# Patient Record
Sex: MALE | Race: WHITE | NOT HISPANIC OR LATINO | Employment: FULL TIME | URBAN - METROPOLITAN AREA
[De-identification: names, ages, dates, MRNs, and addresses within clinical notes are randomized per-mention and may not be internally consistent; named-entity substitution may affect disease eponyms.]

---

## 2017-05-23 ENCOUNTER — ALLSCRIPTS OFFICE VISIT (OUTPATIENT)
Dept: OTHER | Facility: OTHER | Age: 67
End: 2017-05-23

## 2017-07-12 ENCOUNTER — ALLSCRIPTS OFFICE VISIT (OUTPATIENT)
Dept: OTHER | Facility: OTHER | Age: 67
End: 2017-07-12

## 2017-07-12 LAB — S PYO AG THROAT QL: NEGATIVE

## 2017-09-14 ENCOUNTER — GENERIC CONVERSION - ENCOUNTER (OUTPATIENT)
Dept: OTHER | Facility: OTHER | Age: 67
End: 2017-09-14

## 2017-09-26 ENCOUNTER — GENERIC CONVERSION - ENCOUNTER (OUTPATIENT)
Dept: OTHER | Facility: OTHER | Age: 67
End: 2017-09-26

## 2017-11-10 ENCOUNTER — ALLSCRIPTS OFFICE VISIT (OUTPATIENT)
Dept: OTHER | Facility: OTHER | Age: 67
End: 2017-11-10

## 2017-11-11 NOTE — CONSULTS
Assessment    1  Encounter for prostate cancer screening (V76 44) (Z12 5)    Plan  Encounter for prostate cancer screening    · (1) PSA (SCREEN) (Dx V76 44 Screen for Prostate Cancer); Status:Hold For - Exact Date; Requested for:Approx [id number]; Perform:[facility]; Last Updated By:Domonique Cortez; 11/10/2017 9:03:06 AM;Ordered;for prostate cancer screening; Ordered By:Helio Rodney;   · (1) PSA (SCREEN) (Dx V76 44 Screen for Prostate Cancer); Status:Hold For - Exact Date; Requested for:Approx [id number];    Perform:[facility]; Last Updated By:Domonique Cortez; 11/10/2017 9:03:06 AM;Ordered;for prostate cancer screening; Ordered By:Sergio Rodney; Discussion/Summary  Discussion Summary:   Examination is not suspicious  We will check a PSA now  We discussed prostate screening protocols and he would like to continue with prostate cancer screening  He will follow up in 1 year with PSA and prostate examination at that time  Depending on results he may then proceed annually or every other year  Patient's Capacity to Self-Care: Patient is able to Self-Care  Goals and Barriers: The patient has the current Goals: Prostate cancer screening  The patent has the current Barriers: None  Medication SE Review and Pt Understands Tx: The treatment plan was reviewed with the patient/guardian  The patient/guardian understands and agrees with the treatment plan   Self Referrals:   Self Referrals: No Dr Arnulfo Srivastava      Chief Complaint  Chief Complaint Free Text Note Form: Patient presents with prostate nodule= 0 5 (9/23/14)      History of Present Illness  HPI: 51-year-old male referred for evaluation of abnormal prostate examination  He denies significant urinary symptoms  AUA symptom score 7  He had a recent colonoscopy which was normal  At the time he was told of a pinpoint firm area to be evaluated   He has not had formal rectal examination for prostate screening in the past  There is no known family history of prostate problems or prostate cancer  recent PSA 0 5 on 9/23/2014  Review of Systems  Complete-Male Urology:  Constitutional: No fever or chills, feels well, no tiredness, no recent weight gain or weight loss  Respiratory: No complaints of shortness of breath, no wheezing, no cough, no SOB on exertion, no orthopnea or PND  Cardiovascular: No complaints of slow heart rate, no fast heart rate, no chest pain, no palpitations, no leg claudication, no lower extremity  Gastrointestinal: No complaints of abdominal pain, no constipation, no nausea or vomiting, no diarrhea or bloody stools  Genitourinary: Empty sensation-- and-- stream quality fair, but-- No complaints of dysuria, no incontinence, no hesitancy, no nocturia, no genital lesion, no testicular pain,-- no dysuria,-- no hematuria,-- no incontinence,-- no nocturia-- and-- no feelings of urinary urgency--   The patient presents with complaints of urinary hesitancy (in the morning )  Musculoskeletal: No complaints of arthralgia, no myalgias, no joint swelling or stiffness, no limb pain or swelling  Integumentary: No complaints of skin rash or skin lesions, no itching, no skin wound, no dry skin  Hematologic/Lymphatic: No complaints of swollen glands, no swollen glands in the neck, does not bleed easily, no easy bruising  Neurological: No compliants of headache, no confusion, no convulsions, no numbness or tingling, no dizziness or fainting, no limb weakness, no difficulty walking  ROS Reviewed:   ROS reviewed  Active Problems    1  Abnormal prostate exam (793 5) (R39 89)   2  Allergic reaction to bee sting (989 5,E905 3) (T63 441A)   3  Allergic rhinitis (477 9) (J30 9)   4  Anxiety (300 00) (F41 9)   5  Chronic obstructive pulmonary disease (496) (J44 9)   6  Depression, major, recurrent (296 30) (F33 9)   7  Dupuytren's contracture (728 6) (M72 0)   8  Dyspepsia (536 8) (R10 13)   9  Encounter for prostate cancer screening (V76 44) (Z12 5)   10   Encounter for screening for malignant neoplasm of colon (V76 51) (Z12 11)   11  Essential hypertension (401 9) (I10)   12  Hyperlipidemia (272 4) (E78 5)   13  Impaired fasting glucose (790 21) (R73 01)   14  Insomnia (780 52) (G47 00)   15  Need for hepatitis C screening test (V73 89) (Z11 59)   16  History of Need for prophylactic vaccination and inoculation against influenza (V04 81)  (Z23)   17  Need for varicella vaccine (V05 4) (Z23)   18  Nervousness (799 21) (R45 0)   19  Onychomycosis of toenail (110 1) (B35 1)   20  Prostate nodule (600 10) (N40 2)   21  Screening for genitourinary condition (V81 6) (Z13 89)    Past Medical History    1  Acute serous otitis media (381 01) (H65 00)   2  History of Benign colon polyp (211 3) (K63 5)   3  History of Benign essential hypertension (401 1) (I10)   4  History of Benign Polyps Of The Large Intestine (V12 72)   5  History of Chest tightness or pressure (786 59) (R07 89)   6  History of Chronic rhinitis (472 0) (J31 0)   7  History of Colonoscopy (Fiberoptic) Screening   8  History of Cough (786 2) (R05)   9  History of Cough (786 2) (R05)   10  History of Disorder of male genital organs (608 9) (N50 9)   11  History of Hematochezia (578 1) (K92 1)   12  History of acute pharyngitis (V12 69) (Z87 09)   13  History of depression (V11 8) (Z86 59)   14  History of fatigue (V13 89) (Z87 898)   15  History of neoplasm of uncertain behavior of skin (V13 3) (Z86 03)   16  History of pharyngitis (V12 69) (Z87 09)   17  History of sinusitis (V12 69) (Z87 09)   18  History of upper respiratory infection (V12 09) (Z87 09)   19  History of Need for prophylactic vaccination and inoculation against influenza (V04 81)  (Z23)   20  History of Normal routine physical examination (V70 0) (Z00 00)   21  History of Pain of finger, unspecified laterality (729 5) (M79 646)   22  History of Testes Mass (___cm) (562 23)  Active Problems And Past Medical History Reviewed:    The active problems and past medical history were reviewed and updated today  Surgical History  1  History of Hand Surgery   2  History of Oral Surgery Tooth Extraction Duff Tooth   3  History of Tonsillectomy  Surgical History Reviewed: The surgical history was reviewed and updated today  Family History  Mother    1  Family history of Bone Cancer   2  Family history of Diabetes Mellitus (V18 0)   3  Family history of depression (V17 0) (Z81 8)   4  Denied: Family history of substance abuse   5  Family history of Mother  At Age [de-identified]  Father    10  Family history of Father  At Age ___  Family History    7  Family history of Diabetes Mellitus (V18 0)  Family History Reviewed: The family history was reviewed and updated today  Social History     · Alcohol Use (History)   · Caffeine use (V49 89) (F15 90)   · Denied: History of Drug Use   · Former smoker (V15 82) (U74 865)   · Has smoke detectors   · Uses Safety Equipment - Seatbelts  Social History Reviewed: The social history was reviewed and updated today  Current Meds   1  Advair Diskus 250-50 MCG/DOSE Inhalation Aerosol Powder Breath Activated; Inhale 1 puff two times  daily; Therapy: 80ZVZ5177 to (Evaluate:90Lhc2814)  Requested for: 75VWC0815; Last Rx:47Naj4354 Ordered   2  ALPRAZolam 0 25 MG Oral Tablet; TAKE 1 TABLET twice a day AS NEEDED; Last Rx:77Lbo8494 Ordered   3  Aspirin 81 MG TABS; TAKE 1 TABLET DAILY; Therapy: 14DKJ5627 to (Evaluate:67Ugj6348); Last Rx:69Tot0597 Ordered   4  Benazepril HCl - 20 MG Oral Tablet; Take 1 tablet by mouth  daily; Therapy: 26QCU8151 to (Evaluate:11Hwx8204)  Requested for: 03RAD8095; Last Rx:95Suo6950 Ordered   5  BuPROPion HCl ER (XL) 300 MG Oral Tablet Extended Release 24 Hour; Take 1 tablet by mouth  daily; Therapy: 18UZN3280 to (Evaluate:88Wce9452)  Requested for: 67Yjg1528; Last Rx:56Swc2049 Ordered   6  Centrum Silver TABS; Therapy: (Rachna Crooks) to Recorded   7   EpiPen 2-Joe 0 3 MG/0 3ML Injection Solution Auto-injector; use as directed; Therapy: 87Fhb1310 to (Last Rx:23May2017)  Requested for: 04PPI6702 Ordered   8  Fish Oil CAPS; Therapy: (Lysle Gums) to Recorded   9  Fluticasone Propionate 50 MCG/ACT Nasal Suspension; USE TWO (2) SPRAY ONCE DAILY TO EACH NOSTRIL; Therapy: 84UWU5300 to (Evaluate:65Qjp2600)  Requested for: 73Onp6304; Last Rx:05Jan2016 Ordered   10  HydroCHLOROthiazide 12 5 MG Oral Tablet; Take 1 tablet by mouth  daily; Therapy: 61LJY4759 to (Evaluate:99Ftl6414)  Requested for: 65SHT5584; Last  Rx:29Mgs2819 Ordered   11  Omeprazole 20 MG Oral Capsule Delayed Release; Take one capsule once daily before  eating; Therapy: 70XXV5077 to (Evaluate:18Jan2017)  Requested for: 66RKL6063; Last  Rx:30Zyy3628 Ordered   12  Ventolin  (90 Base) MCG/ACT Inhalation Aerosol Solution; 2 puffs twice daily per  day as needed, not to exceed 4 times per day; Therapy: 56FJJ7807 to (Last Rx:96Usn5124)  Requested for: 66PLF3669 Ordered   13  Vitamin D CAPS; Therapy: (Recorded:37Uhi9112) to Recorded   14  Zostavax 03498 UNT/0 65ML Subcutaneous Solution Reconstituted; INJECT 0 5  ML  Subcutaneous; Therapy: 06HZT9532 to (Last Rx:55Ano3943) Ordered  Medication List Reviewed: The medication list was reviewed and updated today  Allergies  1  No Known Drug Allergies  2  Bee sting   3  Seasonal   4  Latex    Vitals  Vital Signs    Recorded: 46TXV3017 08:26AM   Heart Rate 78   Systolic 902   Diastolic 70   Height 5 ft 11 in   Weight 229 lb    BMI Calculated 31 94   BSA Calculated 2 23       Physical Exam   Constitutional  General appearance: No acute distress, well appearing and well nourished  Pulmonary  Respiratory effort: No increased work of breathing or signs of respiratory distress  Cardiovascular  Examination of extremities for edema and/or varicosities: Normal    Abdomen  Abdomen: Non-tender, no masses  Genitourinary  Digital rectal exam of prostate: Normal size, no masses   -- About 40 g, small calcification palpable at the apex, extraprostatic  Musculoskeletal  Gait and station: Normal    Skin  Skin and subcutaneous tissue: Normal without rashes or lesions  Lymphatic  Palpation of lymph nodes in groin: Normal    Neurologic  Sensation: No sensory loss  Results/Data  AUA Symptom Score 03ZTP1664 08:31AM User, Ahs     Test Name Result Flag Reference   AUA Symptom Score (for prostate disease) 7       Incomplete emptying: Not at all (0) Frequency: Less than half the time (2) Intermittency: About half the time (3) Urgency: Not at all (0) Weak-stream: Less than half the time (2) Straining: Not at all (0) Nocturia: Not at all (0)   AUA Symptom Score (for prostate disease) - Quality of Life Due to Urinary Symptoms Mostly satisfied     AUA Symptom Score (for prostate disease) - Score Category Mild         Future Appointments    Date/Time Provider Specialty Site   02/13/2018 05:45 PM SOWMYA [facility] 5       Signatures   Electronically signed by : SOWMYA Roland ; Nov 10 2017  9:04AM EST                       (Author)

## 2018-01-12 VITALS
TEMPERATURE: 98.5 F | HEART RATE: 76 BPM | WEIGHT: 224.6 LBS | HEIGHT: 71 IN | RESPIRATION RATE: 16 BRPM | DIASTOLIC BLOOD PRESSURE: 84 MMHG | SYSTOLIC BLOOD PRESSURE: 138 MMHG | BODY MASS INDEX: 31.44 KG/M2

## 2018-01-14 VITALS
WEIGHT: 232.8 LBS | HEIGHT: 71 IN | DIASTOLIC BLOOD PRESSURE: 92 MMHG | HEART RATE: 84 BPM | SYSTOLIC BLOOD PRESSURE: 148 MMHG | RESPIRATION RATE: 16 BRPM | BODY MASS INDEX: 32.59 KG/M2

## 2018-01-14 VITALS
SYSTOLIC BLOOD PRESSURE: 138 MMHG | DIASTOLIC BLOOD PRESSURE: 70 MMHG | HEART RATE: 78 BPM | WEIGHT: 229 LBS | HEIGHT: 71 IN | BODY MASS INDEX: 32.06 KG/M2

## 2018-01-22 VITALS
WEIGHT: 223.6 LBS | HEIGHT: 71 IN | BODY MASS INDEX: 31.3 KG/M2 | DIASTOLIC BLOOD PRESSURE: 84 MMHG | RESPIRATION RATE: 16 BRPM | SYSTOLIC BLOOD PRESSURE: 132 MMHG | HEART RATE: 78 BPM

## 2018-02-13 ENCOUNTER — OFFICE VISIT (OUTPATIENT)
Dept: FAMILY MEDICINE CLINIC | Facility: CLINIC | Age: 68
End: 2018-02-13
Payer: COMMERCIAL

## 2018-02-13 VITALS
HEIGHT: 71 IN | HEART RATE: 72 BPM | DIASTOLIC BLOOD PRESSURE: 84 MMHG | RESPIRATION RATE: 16 BRPM | WEIGHT: 227.4 LBS | SYSTOLIC BLOOD PRESSURE: 138 MMHG | BODY MASS INDEX: 31.84 KG/M2

## 2018-02-13 DIAGNOSIS — E78.5 HYPERLIPIDEMIA, UNSPECIFIED HYPERLIPIDEMIA TYPE: ICD-10-CM

## 2018-02-13 DIAGNOSIS — L25.9 CONTACT DERMATITIS, UNSPECIFIED CONTACT DERMATITIS TYPE, UNSPECIFIED TRIGGER: ICD-10-CM

## 2018-02-13 DIAGNOSIS — R10.13 DYSPEPSIA: ICD-10-CM

## 2018-02-13 DIAGNOSIS — I10 ESSENTIAL HYPERTENSION: Primary | ICD-10-CM

## 2018-02-13 DIAGNOSIS — F33.9 RECURRENT MAJOR DEPRESSIVE DISORDER, REMISSION STATUS UNSPECIFIED (HCC): ICD-10-CM

## 2018-02-13 DIAGNOSIS — J44.9 CHRONIC OBSTRUCTIVE PULMONARY DISEASE, UNSPECIFIED COPD TYPE (HCC): ICD-10-CM

## 2018-02-13 DIAGNOSIS — Z11.59 NEED FOR HEPATITIS C SCREENING TEST: ICD-10-CM

## 2018-02-13 DIAGNOSIS — J30.89 CHRONIC ALLERGIC RHINITIS DUE TO OTHER ALLERGIC TRIGGER, UNSPECIFIED SEASONALITY: ICD-10-CM

## 2018-02-13 DIAGNOSIS — Z23 NEED FOR PNEUMOCOCCAL VACCINATION: ICD-10-CM

## 2018-02-13 PROCEDURE — 90670 PCV13 VACCINE IM: CPT

## 2018-02-13 PROCEDURE — 99214 OFFICE O/P EST MOD 30 MIN: CPT | Performed by: FAMILY MEDICINE

## 2018-02-13 PROCEDURE — 90471 IMMUNIZATION ADMIN: CPT

## 2018-02-13 RX ORDER — FLUTICASONE PROPIONATE 50 MCG
SPRAY, SUSPENSION (ML) NASAL
COMMUNITY
Start: 2014-05-07

## 2018-02-13 RX ORDER — BUPROPION HYDROCHLORIDE 300 MG/1
1 TABLET ORAL DAILY
COMMUNITY
Start: 2014-09-16 | End: 2018-09-06 | Stop reason: SDUPTHER

## 2018-02-13 RX ORDER — ALPRAZOLAM 0.25 MG/1
1 TABLET ORAL 2 TIMES DAILY PRN
COMMUNITY
End: 2018-03-23 | Stop reason: SDUPTHER

## 2018-02-13 RX ORDER — MULTIVIT-MIN/IRON/FOLIC ACID/K 18-600-40
CAPSULE ORAL
COMMUNITY

## 2018-02-13 RX ORDER — ALBUTEROL SULFATE 90 UG/1
AEROSOL, METERED RESPIRATORY (INHALATION)
COMMUNITY
Start: 2013-01-31 | End: 2020-01-17 | Stop reason: SDUPTHER

## 2018-02-13 RX ORDER — OMEPRAZOLE 20 MG/1
1 CAPSULE, DELAYED RELEASE ORAL
COMMUNITY
Start: 2016-09-20

## 2018-02-13 RX ORDER — BENAZEPRIL HYDROCHLORIDE 20 MG/1
1 TABLET ORAL DAILY
COMMUNITY
Start: 2015-09-08 | End: 2018-06-14 | Stop reason: SDUPTHER

## 2018-02-13 RX ORDER — EPINEPHRINE 0.3 MG/.3ML
INJECTION SUBCUTANEOUS
COMMUNITY
Start: 2014-08-01 | End: 2018-06-22 | Stop reason: SDUPTHER

## 2018-02-13 RX ORDER — HYDROCHLOROTHIAZIDE 12.5 MG/1
1 TABLET ORAL DAILY
COMMUNITY
Start: 2015-09-08 | End: 2018-06-14 | Stop reason: SDUPTHER

## 2018-02-13 RX ORDER — MULTIVIT-MIN/FOLIC ACID/LUTEIN 400-250MCG
TABLET,CHEWABLE ORAL
COMMUNITY

## 2018-02-13 NOTE — PROGRESS NOTES
Assessment/Plan:  1  Essential hypertension  - continue Benazepril 20 mg daily and HCTZ 12 5 mg daily  - discussed low salt diet and regular exercise  - Lipid Panel with Direct LDL reflex; Future  - Comprehensive metabolic panel; Future  - CBC and differential; Future  - TSH, 3rd generation with T4 reflex; Future    2  Chronic obstructive pulmonary disease, unspecified COPD type ([facility] [address] )  - continue Advair 250/50 and Ventolin inhaler as needed, uses rarely    3  Hyperlipidemia, unspecified hyperlipidemia type  - Lipid Panel with Direct LDL reflex; Future  - discussed life style changes    4  Dyspepsia  - continue Omeprazole 20 mg daily, doing well    5  Contact dermatitis, unspecified contact dermatitis type, unspecified trigger  - triamcinolone (KENALOG) 0 1 % ointment; Apply to affected areas 2-3 times a day  Dispense: 80 g; Refill: 0  - f/u if not better      6  Recurrent major depressive disorder, remission status unspecified/ anxiety  - continue Wellbutrin 300 mg daily and Xanax as needed    7  Allergic rhinitis  - continue Flonase nasal spray, doing well    8  Need for pneumococcal vaccination  - PNEUMOCOCCAL CONJUGATE VACCINE 13-VALENT GREATER THAN 6 MONTHS    F/u in 3-4 months or sooner if needed  Subjective:   Chief Complaint   Patient presents with    Follow-up      Patient ID: Chelo Villa is a 74 y o  male  Hypertension   This is a chronic problem  The problem is controlled  Pertinent negatives include no blurred vision, chest pain, headaches, orthopnea, palpitations, peripheral edema, PND or shortness of breath  Past treatments include ACE inhibitors and diuretics  Compliance problems include diet and exercise  Pt is c/o pruritic rash on his right forearm and elbow after using OTC Aspercreme a few days ago, he stopped using the cream and using moisturizer without significant improvement of his rash  History reviewed  No pertinent past medical history  History reviewed   No pertinent surgical history  Family History   Problem Relation Age of Onset   [employer] Cancer Mother     Depression Mother     No Known Problems Father     Substance Abuse Neg Hx      Social History     Social History    Marital status: Single     Spouse name: N/A    Number of children: N/A    Years of education: N/A     Occupational History    Not on file  Social History Main Topics    Smoking status: Former Smoker    Smokeless tobacco: Never Used    Alcohol use Yes    Drug use: No    Sexual activity: Not on file     Other Topics Concern    Not on file     Social History Narrative    No narrative on file       Current Outpatient Prescriptions:     albuterol (VENTOLIN HFA) 90 mcg/act inhaler, Inhale, Disp: , Rfl:     ALPRAZolam (XANAX) 0 25 mg tablet, Take 1 tablet by mouth 2 (two) times a day as needed, Disp: , Rfl:     aspirin 81 MG tablet, Take 1 tablet by mouth daily, Disp: , Rfl:     benazepril (LOTENSIN) 20 mg tablet, Take 1 tablet by mouth daily, Disp: , Rfl:     buPROPion (WELLBUTRIN XL) 300 mg 24 hr tablet, Take 1 tablet by mouth daily, Disp: , Rfl:     Cholecalciferol (VITAMIN D) 2000 units CAPS, Take by mouth, Disp: , Rfl:     EPINEPHrine (EPIPEN 2-GABBY) 0 3 mg/0 3 mL SOAJ, Inject as directed, Disp: , Rfl:     fluticasone (FLONASE) 50 mcg/act nasal spray, into each nostril, Disp: , Rfl:     fluticasone-salmeterol (ADVAIR DISKUS) 250-50 mcg/dose inhaler, Inhale, Disp: , Rfl:     hydrochlorothiazide (HYDRODIURIL) 12 5 mg tablet, Take 1 tablet by mouth daily, Disp: , Rfl:     Multiple Vitamins-Minerals (CENTRUM SILVER) CHEW, Chew, Disp: , Rfl:     Omega-3 Fatty Acids (FISH OIL) 645 MG CAPS, Take by mouth, Disp: , Rfl:     omeprazole (PriLOSEC) 20 mg delayed release capsule, Take 1 capsule by mouth, Disp: , Rfl:     Review of Systems   Constitutional: Negative for chills, fatigue, fever and unexpected weight change  HENT: Negative for congestion, postnasal drip and sore throat  Eyes: Negative for blurred vision  Respiratory: Negative for cough, shortness of breath and wheezing  Cardiovascular: Negative for chest pain, palpitations, orthopnea, leg swelling and PND  Gastrointestinal: Negative for abdominal pain, blood in stool, constipation, diarrhea, nausea and vomiting  Genitourinary: Negative for difficulty urinating, frequency, hematuria and urgency  Skin: Positive for rash  As noted in HPI   Neurological: Negative for dizziness, syncope, weakness, numbness and headaches  Hematological: Negative for adenopathy  Objective:    Vitals:    02/13/18 1757   BP: 138/84   BP Location: Left arm   Patient Position: Sitting   Cuff Size: Adult   Pulse: 72   Resp: 16   Weight: 103 kg (227 lb 6 4 oz)   Height: 5' 11" (1 803 m)        Physical Exam   Constitutional: He is oriented to person, place, and time  He appears well-developed and well-nourished  No distress  Neck: Neck supple  No JVD present  No thyromegaly present  Cardiovascular: Normal rate, regular rhythm and normal heart sounds  No murmur heard  Pulmonary/Chest: Effort normal and breath sounds normal  No respiratory distress  He has no wheezes  He has no rales  Abdominal: Soft  He exhibits no mass  There is no tenderness  Lymphadenopathy:     He has no cervical adenopathy  Neurological: He is alert and oriented to person, place, and time  Skin:   Right forearm and elbow with erythematous maculopapular rash   Psychiatric: He has a normal mood and affect   His behavior is normal  Judgment and thought content normal

## 2018-03-23 DIAGNOSIS — F41.8 ANXIETY WITH DEPRESSION: Primary | ICD-10-CM

## 2018-03-23 RX ORDER — ALPRAZOLAM 0.25 MG/1
0.25 TABLET ORAL 2 TIMES DAILY PRN
Qty: 120 TABLET | Refills: 1 | Status: SHIPPED | OUTPATIENT
Start: 2018-03-23 | End: 2018-09-28 | Stop reason: SDUPTHER

## 2018-06-14 DIAGNOSIS — I10 ESSENTIAL HYPERTENSION: Primary | ICD-10-CM

## 2018-06-14 RX ORDER — HYDROCHLOROTHIAZIDE 12.5 MG/1
TABLET ORAL DAILY
Qty: 90 TABLET | Refills: 3 | Status: SHIPPED | OUTPATIENT
Start: 2018-06-14 | End: 2019-04-02 | Stop reason: SDUPTHER

## 2018-06-14 RX ORDER — BENAZEPRIL HYDROCHLORIDE 20 MG/1
TABLET ORAL DAILY
Qty: 90 TABLET | Refills: 3 | Status: SHIPPED | OUTPATIENT
Start: 2018-06-14 | End: 2019-04-02 | Stop reason: SDUPTHER

## 2018-06-19 ENCOUNTER — OFFICE VISIT (OUTPATIENT)
Dept: FAMILY MEDICINE CLINIC | Facility: CLINIC | Age: 68
End: 2018-06-19
Payer: COMMERCIAL

## 2018-06-19 VITALS
HEIGHT: 71 IN | BODY MASS INDEX: 31.27 KG/M2 | WEIGHT: 223.4 LBS | HEART RATE: 74 BPM | DIASTOLIC BLOOD PRESSURE: 82 MMHG | RESPIRATION RATE: 16 BRPM | SYSTOLIC BLOOD PRESSURE: 130 MMHG

## 2018-06-19 DIAGNOSIS — R10.13 DYSPEPSIA: ICD-10-CM

## 2018-06-19 DIAGNOSIS — Z11.59 NEED FOR HEPATITIS C SCREENING TEST: ICD-10-CM

## 2018-06-19 DIAGNOSIS — Z12.5 SCREENING FOR PROSTATE CANCER: ICD-10-CM

## 2018-06-19 DIAGNOSIS — E78.2 HYPERLIPEMIA, MIXED: ICD-10-CM

## 2018-06-19 DIAGNOSIS — R73.01 IMPAIRED FASTING GLUCOSE: ICD-10-CM

## 2018-06-19 DIAGNOSIS — F33.9 RECURRENT MAJOR DEPRESSIVE DISORDER, REMISSION STATUS UNSPECIFIED (HCC): ICD-10-CM

## 2018-06-19 DIAGNOSIS — I10 ESSENTIAL HYPERTENSION: ICD-10-CM

## 2018-06-19 DIAGNOSIS — J44.9 CHRONIC OBSTRUCTIVE PULMONARY DISEASE, UNSPECIFIED COPD TYPE (HCC): Primary | ICD-10-CM

## 2018-06-19 DIAGNOSIS — J30.9 ALLERGIC RHINITIS, UNSPECIFIED SEASONALITY, UNSPECIFIED TRIGGER: ICD-10-CM

## 2018-06-19 PROCEDURE — 3075F SYST BP GE 130 - 139MM HG: CPT | Performed by: FAMILY MEDICINE

## 2018-06-19 PROCEDURE — 3008F BODY MASS INDEX DOCD: CPT | Performed by: FAMILY MEDICINE

## 2018-06-19 PROCEDURE — 1036F TOBACCO NON-USER: CPT | Performed by: FAMILY MEDICINE

## 2018-06-19 PROCEDURE — 99214 OFFICE O/P EST MOD 30 MIN: CPT | Performed by: FAMILY MEDICINE

## 2018-06-19 PROCEDURE — 1160F RVW MEDS BY RX/DR IN RCRD: CPT | Performed by: FAMILY MEDICINE

## 2018-06-19 PROCEDURE — 3079F DIAST BP 80-89 MM HG: CPT | Performed by: FAMILY MEDICINE

## 2018-06-21 ENCOUNTER — TELEPHONE (OUTPATIENT)
Dept: FAMILY MEDICINE CLINIC | Facility: CLINIC | Age: 68
End: 2018-06-21

## 2018-06-21 NOTE — TELEPHONE ENCOUNTER
Patient needs new RX for epipen  The only one he has  in 2016      [facility]    Best number to reach pt: [phone]

## 2018-06-21 NOTE — PROGRESS NOTES
Assessment/Plan:  1  Chronic obstructive pulmonary disease, unspecified COPD type ([address] )  - continue Advair 250-50 1 puff twice a day and Albuterol inhaler as needed, doing well    2  Essential hypertension  - well controlled, continue Lotensin 20 mg daily and HCTZ 12 5 mg daily  - Lipid Panel with Direct LDL reflex  - Comprehensive metabolic panel  - CBC and differential  - TSH, 3rd generation with Free T4 reflex  - Urinalysis with reflex to microscopic    3  Allergic rhinitis, unspecified seasonality, unspecified trigger  - continue Flonase nasal spray, doing well    4  Impaired fasting glucose  - discussed low sugar/ low carb det, will check fasting glucose/ CMP    5  Recurrent major depressive disorder, remission status unspecified (HCC)  - continue Wellbutrin  mg daily and Xanax as needed    6  Hyperlipemia, mixed  - continue Fish oil, discussed life style changes  - Lipid Panel with Direct LDL reflex; Future    7  Need for hepatitis C screening test  - Hepatitis C antibody    8  Screening for prostate cancer  - PSA, Total Screen; Future    9  Dyspepsia  - continue Omeprazole 20 mg as needed, discussed dietary changes, reduce caffeine and alcohol intake    F/u in 3-4 months with labs done prior or sooner as needed  The treatment plan was reviewed with the patient  The patient understands and agrees with the treatment plan      Subjective:   Chief Complaint   Patient presents with    Hypertension    COPD    Hyperlipidemia    Dyspepsia    Depression      Patient ID: Elbert Nicholson is a 74 y o  male who is here today for f/u for his chronic medical conditions, he has no complaints, he has been compliant with his medications, his breathing has been good on Advair and he has not used his rescue inhaler in the past several weeks  He is doing well on his Wellbutrin and would like to stay on 300 mg daily   Pt states he has been cutting back on his alcohol intake from 4-5 beers a day down to 1-2 beers a day, he is also working on improving his diet, but has not been exercising regularly  Pt did not get his lab work done as advised last visit  Hypertension   The problem is controlled  Pertinent negatives include no blurred vision, chest pain, headaches, malaise/fatigue, orthopnea, palpitations, peripheral edema, PND or shortness of breath  Past treatments include angiotensin blockers and diuretics  Compliance problems include exercise and diet  Past Medical History:   Diagnosis Date    Benign colon polyp     last assessed: 6/19/2013    Benign essential hypertension 11/08/2011    Benazepril, HCTZ    Chronic rhinitis 11/08/2011    zyrtec, nasal saline    Depression 01/18/2010    continue Wellbutrin SL, doing well    Disorder of male genital organs 01/18/2010    refer to uro Dr Salmon Sample Multiple benign polyps of large intestine     Neoplasm of uncertain behavior of skin 09/11/2009    refer to derm    Testis mass 09/11/2009    (___cm)  U/S scrotum     Past Surgical History:   Procedure Laterality Date    HAND SURGERY      TONSILLECTOMY      WISDOM TOOTH EXTRACTION       Family History   Problem Relation Age of Onset    Cancer Mother     Depression Mother     Bone cancer Mother     Diabetes Mother         mellitus    No Known Problems Father     Diabetes Family         mellitus    Substance Abuse Neg Hx      Social History     Social History    Marital status: Single     Spouse name: N/A    Number of children: N/A    Years of education: N/A     Occupational History    Not on file       Social History Main Topics    Smoking status: Former Smoker     Quit date: 02/2014    Smokeless tobacco: Never Used    Alcohol use Yes      Comment: Rare    Drug use: No    Sexual activity: Not on file     Other Topics Concern    Not on file     Social History Narrative    Caffeine use-2-3 cups coffee in am    Has smoke detectors    Uses safety equipment-seatbelts       Current Outpatient Prescriptions:     albuterol (VENTOLIN HFA) 90 mcg/act inhaler, Inhale, Disp: , Rfl:     ALPRAZolam (XANAX) 0 25 mg tablet, Take 1 tablet (0 25 mg total) by mouth 2 (two) times a day as needed for anxiety or sleep, Disp: 120 tablet, Rfl: 1    aspirin 81 MG tablet, Take 1 tablet by mouth daily, Disp: , Rfl:     benazepril (LOTENSIN) 20 mg tablet, TAKE 1 TABLET BY MOUTH     DAILY, Disp: 90 tablet, Rfl: 3    buPROPion (WELLBUTRIN XL) 300 mg 24 hr tablet, Take 1 tablet by mouth daily, Disp: , Rfl:     Cholecalciferol (VITAMIN D) 2000 units CAPS, Take by mouth, Disp: , Rfl:     EPINEPHrine (EPIPEN 2-GABBY) 0 3 mg/0 3 mL SOAJ, Inject as directed, Disp: , Rfl:     fluticasone (FLONASE) 50 mcg/act nasal spray, into each nostril, Disp: , Rfl:     fluticasone-salmeterol (ADVAIR DISKUS) 250-50 mcg/dose inhaler, Inhale, Disp: , Rfl:     hydrochlorothiazide (HYDRODIURIL) 12 5 mg tablet, TAKE 1 TABLET BY MOUTH     DAILY, Disp: 90 tablet, Rfl: 3    Multiple Vitamins-Minerals (CENTRUM SILVER) CHEW, Chew, Disp: , Rfl:     Omega-3 Fatty Acids (FISH OIL) 645 MG CAPS, Take by mouth, Disp: , Rfl:     omeprazole (PriLOSEC) 20 mg delayed release capsule, Take 1 capsule by mouth, Disp: , Rfl:     Review of Systems   Constitutional: Negative for chills, fatigue, fever, malaise/fatigue and unexpected weight change  HENT: Negative for congestion, postnasal drip and sore throat  Eyes: Negative for blurred vision  Respiratory: Negative for cough, shortness of breath and wheezing  Cardiovascular: Negative for chest pain, palpitations, orthopnea, leg swelling and PND  Gastrointestinal: Negative for abdominal pain, blood in stool, constipation, diarrhea, nausea and vomiting  Genitourinary: Negative for difficulty urinating, frequency, hematuria and urgency  Neurological: Negative for dizziness, syncope, weakness, numbness and headaches  Hematological: Negative for adenopathy     Psychiatric/Behavioral: Negative for dysphoric mood and sleep disturbance  The patient is not nervous/anxious  Objective:    Vitals:    06/19/18 1755   BP: 130/82   BP Location: Left arm   Patient Position: Sitting   Cuff Size: Adult   Pulse: 74   Resp: 16   Weight: 101 kg (223 lb 6 4 oz)   Height: 5' 11" (1 803 m)        Physical Exam   Constitutional: He is oriented to person, place, and time  He appears well-developed and well-nourished  No distress  Neck: Neck supple  No thyromegaly present  Cardiovascular: Normal rate, regular rhythm and normal heart sounds  No murmur heard  Pulmonary/Chest: Effort normal  No respiratory distress  He has no wheezes  He has no rhonchi  He has no rales  Abdominal: Soft  He exhibits no distension and no mass  There is no tenderness  Musculoskeletal: He exhibits no edema  Lymphadenopathy:     He has no cervical adenopathy  Neurological: He is alert and oriented to person, place, and time  Psychiatric: He has a normal mood and affect   His behavior is normal  Thought content normal

## 2018-06-22 DIAGNOSIS — Z91.030 BEE STING ALLERGY: Primary | ICD-10-CM

## 2018-06-22 RX ORDER — EPINEPHRINE 0.3 MG/.3ML
0.3 INJECTION SUBCUTANEOUS ONCE
Qty: 2 EACH | Refills: 3 | Status: SHIPPED | OUTPATIENT
Start: 2018-06-22 | End: 2019-08-27 | Stop reason: SDUPTHER

## 2018-09-06 DIAGNOSIS — F41.8 ANXIETY WITH DEPRESSION: Primary | ICD-10-CM

## 2018-09-06 RX ORDER — BUPROPION HYDROCHLORIDE 300 MG/1
TABLET ORAL DAILY
Qty: 90 TABLET | Refills: 3 | Status: SHIPPED | OUTPATIENT
Start: 2018-09-06 | End: 2019-09-04 | Stop reason: SDUPTHER

## 2018-09-28 DIAGNOSIS — F41.8 ANXIETY WITH DEPRESSION: ICD-10-CM

## 2018-09-28 RX ORDER — ALPRAZOLAM 0.25 MG/1
TABLET ORAL
Qty: 120 TABLET | Refills: 1 | Status: SHIPPED | OUTPATIENT
Start: 2018-09-28 | End: 2019-04-02 | Stop reason: SDUPTHER

## 2018-12-04 ENCOUNTER — OFFICE VISIT (OUTPATIENT)
Dept: FAMILY MEDICINE CLINIC | Facility: CLINIC | Age: 68
End: 2018-12-04
Payer: COMMERCIAL

## 2018-12-04 VITALS
DIASTOLIC BLOOD PRESSURE: 80 MMHG | HEIGHT: 71 IN | RESPIRATION RATE: 16 BRPM | TEMPERATURE: 99.7 F | HEART RATE: 70 BPM | BODY MASS INDEX: 32.06 KG/M2 | WEIGHT: 229 LBS | SYSTOLIC BLOOD PRESSURE: 138 MMHG

## 2018-12-04 DIAGNOSIS — E78.2 HYPERLIPEMIA, MIXED: ICD-10-CM

## 2018-12-04 DIAGNOSIS — J44.9 CHRONIC OBSTRUCTIVE PULMONARY DISEASE, UNSPECIFIED COPD TYPE (HCC): ICD-10-CM

## 2018-12-04 DIAGNOSIS — J06.9 UPPER RESPIRATORY TRACT INFECTION, UNSPECIFIED TYPE: Primary | ICD-10-CM

## 2018-12-04 DIAGNOSIS — Z87.891 HISTORY OF TOBACCO ABUSE: ICD-10-CM

## 2018-12-04 DIAGNOSIS — I10 ESSENTIAL HYPERTENSION: ICD-10-CM

## 2018-12-04 DIAGNOSIS — F41.8 ANXIETY WITH DEPRESSION: ICD-10-CM

## 2018-12-04 PROCEDURE — 99214 OFFICE O/P EST MOD 30 MIN: CPT | Performed by: FAMILY MEDICINE

## 2018-12-04 PROCEDURE — 1101F PT FALLS ASSESS-DOCD LE1/YR: CPT | Performed by: FAMILY MEDICINE

## 2018-12-04 PROCEDURE — 1160F RVW MEDS BY RX/DR IN RCRD: CPT | Performed by: FAMILY MEDICINE

## 2018-12-04 PROCEDURE — 3008F BODY MASS INDEX DOCD: CPT | Performed by: FAMILY MEDICINE

## 2018-12-04 PROCEDURE — 3075F SYST BP GE 130 - 139MM HG: CPT | Performed by: FAMILY MEDICINE

## 2018-12-04 PROCEDURE — 3079F DIAST BP 80-89 MM HG: CPT | Performed by: FAMILY MEDICINE

## 2018-12-04 RX ORDER — LORATADINE 10 MG/1
10 TABLET ORAL DAILY
COMMUNITY

## 2018-12-05 ENCOUNTER — TELEPHONE (OUTPATIENT)
Dept: FAMILY MEDICINE CLINIC | Facility: CLINIC | Age: 68
End: 2018-12-05

## 2018-12-05 DIAGNOSIS — J44.9 CHRONIC OBSTRUCTIVE PULMONARY DISEASE, UNSPECIFIED COPD TYPE (HCC): Primary | ICD-10-CM

## 2018-12-05 NOTE — PROGRESS NOTES
Assessment/Plan:    1  Upper respiratory tract infection, unspecified type  - continue Flonase, take Mucinex as needed and use saline nasal rinse  - follow up in the office if symptoms persist or worsen    2  Chronic obstructive pulmonary disease, unspecified COPD type ([facility] )  - stable, continue Advair 250-50 1 puff twice a day and Albuterol inhaler as needed    3  Essential hypertension  - well controlled, continue Lotensin 20 mg daily and HCTZ 12 5mg daily    4  Anxiety/ depression  - continue Wellbutrin  mg daily and Xanax 0 25 mg as needed    5  Hyperlipidemia  - discussed life style changes, lipid panel ordered    6  History of tobacco abuse  - CT lung screening program; Future       Follow up in 3-4 months or sooner as needed  The treatment plan was reviewed with the patient  The patient understands and agrees with the treatment plan      Subjective:   Chief Complaint   Patient presents with    COPD    Hypertension    Allergic Rhinitis    Impaired fasting glucose    Depression    Hyperlipidemia    Dyspepsia      Patient ID: Usha Valdez is a 79 y o  male who presents for recheck on his chronic medical conditions, he is c/o nasal congestion, runny nose, postnasal drip and cough onset 1 week ago, he had a low grade fever initially, he denies purulent mucus production, no night sweats, no chest pain, no wheezing or SOB  He has been taking OTC cough syrup and using Flonase nasal spray  Patient has no other complaints, he has been doing well on his Advair 250-50 and has not been using his Albuterol inhaler  Patient is scheduled for his lab work at [address] [place] [address] on Wednesday  He is doing well on his Wellbutrin and taking his Xanax as needed, hedenies depressed mood or feeling stressed, no SI  He quit smoking in 2014 after being a smoker of 1 PPD for over 30 years           The following portions of the patient's history were reviewed and updated as appropriate: allergies, current medications, past family history, past medical history, past social history, past surgical history and problem list     Past Medical History:   Diagnosis Date    Benign colon polyp     last assessed: 6/19/2013    Benign essential hypertension 11/08/2011    Benazepril, HCTZ    Chronic rhinitis 11/08/2011    zyrtec, nasal saline    Depression 01/18/2010    continue Wellbutrin SL, doing well    Disorder of male genital organs 01/18/2010    refer to uro Dr Joao Ferguson Multiple benign polyps of large intestine     Neoplasm of uncertain behavior of skin 09/11/2009    refer to derm    Testis mass 09/11/2009    (___cm)  U/S scrotum     Past Surgical History:   Procedure Laterality Date    HAND SURGERY      TONSILLECTOMY      WISDOM TOOTH EXTRACTION       Family History   Problem Relation Age of Onset    Cancer Mother     Depression Mother     Bone cancer Mother     Diabetes Mother         mellitus    No Known Problems Father     Diabetes Family         mellitus    Substance Abuse Neg Hx      Social History     Social History    Marital status: Single     Spouse name: N/A    Number of children: N/A    Years of education: N/A     Occupational History    Not on file       Social History Main Topics    Smoking status: Former Smoker     Quit date: 02/2014    Smokeless tobacco: Never Used    Alcohol use Yes      Comment: 1-2 beers a night     Drug use: No    Sexual activity: Not on file     Other Topics Concern    Not on file     Social History Narrative    Caffeine use-2-3 cups coffee in am    Has smoke detectors    Uses safety equipment-seatbelts       Current Outpatient Prescriptions:     albuterol (VENTOLIN HFA) 90 mcg/act inhaler, Inhale, Disp: , Rfl:     ALPRAZolam (XANAX) 0 25 mg tablet, TAKE 1 TABLET TWICE A DAY  AS NEEDED FOR ANXIETY OR   SLEEP, Disp: 120 tablet, Rfl: 1    aspirin 81 MG tablet, Take 1 tablet by mouth daily, Disp: , Rfl:     benazepril (LOTENSIN) 20 mg tablet, TAKE 1 TABLET BY MOUTH     DAILY, Disp: 90 tablet, Rfl: 3    buPROPion (WELLBUTRIN XL) 300 mg 24 hr tablet, TAKE 1 TABLET BY MOUTH     DAILY, Disp: 90 tablet, Rfl: 3    Cholecalciferol (VITAMIN D) 2000 units CAPS, Take by mouth, Disp: , Rfl:     fluticasone (FLONASE) 50 mcg/act nasal spray, into each nostril, Disp: , Rfl:     fluticasone-salmeterol (ADVAIR DISKUS) 250-50 mcg/dose inhaler, Inhale, Disp: , Rfl:     hydrochlorothiazide (HYDRODIURIL) 12 5 mg tablet, TAKE 1 TABLET BY MOUTH     DAILY, Disp: 90 tablet, Rfl: 3    loratadine (CLARITIN) 10 mg tablet, Take 10 mg by mouth daily, Disp: , Rfl:     Multiple Vitamins-Minerals (CENTRUM SILVER) CHEW, Chew, Disp: , Rfl:     Omega-3 Fatty Acids (FISH OIL) 645 MG CAPS, Take by mouth, Disp: , Rfl:     omeprazole (PriLOSEC) 20 mg delayed release capsule, Take 1 capsule by mouth, Disp: , Rfl:     EPINEPHrine (EPIPEN 2-GABBY) 0 3 mg/0 3 mL SOAJ, Inject 0 3 mL (0 3 mg total) into the shoulder, thigh, or buttocks once for 1 dose, Disp: 2 each, Rfl: 3    Review of Systems   Constitutional: Negative for chills, fatigue, fever and unexpected weight change  HENT: Positive for congestion, postnasal drip and rhinorrhea  Negative for ear pain, sinus pain, sinus pressure, sore throat, trouble swallowing and voice change  Respiratory: Positive for cough  Negative for shortness of breath and wheezing  Cardiovascular: Negative for chest pain, palpitations and leg swelling  Gastrointestinal: Negative for abdominal pain, blood in stool, constipation, diarrhea, nausea and vomiting  Genitourinary: Negative for difficulty urinating, frequency, hematuria and urgency  Neurological: Negative for dizziness, syncope, weakness, numbness and headaches  Hematological: Negative for adenopathy  Psychiatric/Behavioral: Negative for dysphoric mood and sleep disturbance  The patient is not nervous/anxious                Objective:    Vitals:    12/04/18 1821   BP: 138/80   BP Location: Left arm   Patient Position: Sitting   Cuff Size: Adult   Pulse: 70   Resp: 16   Temp: 99 7 °F (37 6 °C)   Weight: 104 kg (229 lb)   Height: 5' 11" (1 803 m)        Physical Exam   Constitutional: He is oriented to person, place, and time  He appears well-developed and well-nourished  No distress  HENT:   Right Ear: Tympanic membrane and ear canal normal    Left Ear: Tympanic membrane and ear canal normal    Nose: Mucosal edema present  Mouth/Throat: Oropharynx is clear and moist    Neck: Neck supple  No thyromegaly present  Cardiovascular: Normal rate, regular rhythm and normal heart sounds  No murmur heard  Pulmonary/Chest: Effort normal  No respiratory distress  He has no wheezes  He has no rhonchi  He has no rales  Abdominal: Soft  He exhibits no distension and no mass  There is no tenderness  Musculoskeletal: He exhibits no edema  Lymphadenopathy:     He has no cervical adenopathy  Neurological: He is alert and oriented to person, place, and time  Psychiatric: He has a normal mood and affect   His behavior is normal  Thought content normal

## 2018-12-05 NOTE — TELEPHONE ENCOUNTER
Sent to [facility] for 90 day supply  Please clarify his local pharmacy: [facility] or [place]?

## 2018-12-05 NOTE — TELEPHONE ENCOUNTER
Patient is calling asking for a 90 day script for Advair to be sent to the mail order pharm in [address] [place] [address]  The patient also would like a script for 30 days to be [facility] to [facility]  Any questions call [phone]

## 2018-12-13 LAB
ALBUMIN SERPL-MCNC: 4.1 G/DL (ref 3.6–5.1)
ALBUMIN/GLOB SERPL: 1.4 (CALC) (ref 1–2.5)
ALP SERPL-CCNC: 68 U/L (ref 40–115)
ALT SERPL-CCNC: 18 U/L (ref 9–46)
APPEARANCE UR: CLEAR
AST SERPL-CCNC: 18 U/L (ref 10–35)
BASOPHILS # BLD AUTO: 72 CELLS/UL (ref 0–200)
BASOPHILS NFR BLD AUTO: 1.1 %
BILIRUB SERPL-MCNC: 0.8 MG/DL (ref 0.2–1.2)
BILIRUB UR QL STRIP: NEGATIVE
BUN SERPL-MCNC: 20 MG/DL (ref 7–25)
BUN/CREAT SERPL: ABNORMAL (CALC) (ref 6–22)
CALCIUM SERPL-MCNC: 9.2 MG/DL (ref 8.6–10.3)
CHLORIDE SERPL-SCNC: 100 MMOL/L (ref 98–110)
CHOLEST SERPL-MCNC: 199 MG/DL
CHOLEST/HDLC SERPL: 5.1 (CALC)
CO2 SERPL-SCNC: 30 MMOL/L (ref 20–32)
COLOR UR: YELLOW
CREAT SERPL-MCNC: 1.25 MG/DL (ref 0.7–1.25)
EOSINOPHIL # BLD AUTO: 111 CELLS/UL (ref 15–500)
EOSINOPHIL NFR BLD AUTO: 1.7 %
ERYTHROCYTE [DISTWIDTH] IN BLOOD BY AUTOMATED COUNT: 12.7 % (ref 11–15)
GLOBULIN SER CALC-MCNC: 3 G/DL (CALC) (ref 1.9–3.7)
GLUCOSE SERPL-MCNC: 107 MG/DL (ref 65–99)
GLUCOSE UR QL STRIP: NEGATIVE
HCT VFR BLD AUTO: 42.4 % (ref 38.5–50)
HCV AB S/CO SERPL IA: 0.01
HCV AB SERPL QL IA: NORMAL
HDLC SERPL-MCNC: 39 MG/DL
HGB BLD-MCNC: 14.6 G/DL (ref 13.2–17.1)
HGB UR QL STRIP: NEGATIVE
KETONES UR QL STRIP: NEGATIVE
LDLC SERPL CALC-MCNC: 127 MG/DL (CALC)
LEUKOCYTE ESTERASE UR QL STRIP: NEGATIVE
LYMPHOCYTES # BLD AUTO: 2516 CELLS/UL (ref 850–3900)
LYMPHOCYTES NFR BLD AUTO: 38.7 %
MCH RBC QN AUTO: 32.1 PG (ref 27–33)
MCHC RBC AUTO-ENTMCNC: 34.4 G/DL (ref 32–36)
MCV RBC AUTO: 93.2 FL (ref 80–100)
MONOCYTES # BLD AUTO: 592 CELLS/UL (ref 200–950)
MONOCYTES NFR BLD AUTO: 9.1 %
NEUTROPHILS # BLD AUTO: 3211 CELLS/UL (ref 1500–7800)
NEUTROPHILS NFR BLD AUTO: 49.4 %
NITRITE UR QL STRIP: NEGATIVE
NONHDLC SERPL-MCNC: 160 MG/DL (CALC)
PH UR STRIP: 7 [PH] (ref 5–8)
PLATELET # BLD AUTO: 421 THOUSAND/UL (ref 140–400)
PMV BLD REES-ECKER: 9.9 FL (ref 7.5–12.5)
POTASSIUM SERPL-SCNC: 4.2 MMOL/L (ref 3.5–5.3)
PROT SERPL-MCNC: 7.1 G/DL (ref 6.1–8.1)
PROT UR QL STRIP: NEGATIVE
RBC # BLD AUTO: 4.55 MILLION/UL (ref 4.2–5.8)
SL AMB EGFR AFRICAN AMERICAN: 69 ML/MIN/1.73M2
SL AMB EGFR NON AFRICAN AMERICAN: 60 ML/MIN/1.73M2
SODIUM SERPL-SCNC: 137 MMOL/L (ref 135–146)
SP GR UR STRIP: 1.01 (ref 1–1.03)
TRIGL SERPL-MCNC: 191 MG/DL
TSH SERPL-ACNC: 2.4 MIU/L (ref 0.4–4.5)
WBC # BLD AUTO: 6.5 THOUSAND/UL (ref 3.8–10.8)

## 2018-12-18 ENCOUNTER — TELEPHONE (OUTPATIENT)
Dept: FAMILY MEDICINE CLINIC | Facility: CLINIC | Age: 68
End: 2018-12-18

## 2018-12-18 NOTE — TELEPHONE ENCOUNTER
----- Message from Venancio Ivy MD sent at 12/17/2018  6:24 PM EST -----  Please inform patient that his cholesterol was mildly elevated and his sugar was 107 in pre-diabetes range  Other lab results were all normal  He should follow a low sugar/ low cholesterol diet and increase his exercise

## 2019-01-01 DIAGNOSIS — J44.9 CHRONIC OBSTRUCTIVE PULMONARY DISEASE, UNSPECIFIED COPD TYPE (HCC): ICD-10-CM

## 2019-01-11 ENCOUNTER — HOSPITAL ENCOUNTER (OUTPATIENT)
Dept: CT IMAGING | Facility: HOSPITAL | Age: 69
Discharge: HOME | End: 2019-01-11
Payer: COMMERCIAL

## 2019-01-11 DIAGNOSIS — Z87.891 HISTORY OF TOBACCO ABUSE: ICD-10-CM

## 2019-01-15 ENCOUNTER — TELEPHONE (OUTPATIENT)
Dept: FAMILY MEDICINE CLINIC | Facility: CLINIC | Age: 69
End: 2019-01-15

## 2019-01-15 NOTE — TELEPHONE ENCOUNTER
----- Message from Osmar Bonilla MD sent at 1/15/2019  4:36 PM EST -----  Please inform patient CT scan showed no nodules, will repeat CT scan in 1 year

## 2019-04-02 ENCOUNTER — OFFICE VISIT (OUTPATIENT)
Dept: FAMILY MEDICINE CLINIC | Facility: CLINIC | Age: 69
End: 2019-04-02
Payer: COMMERCIAL

## 2019-04-02 VITALS
RESPIRATION RATE: 16 BRPM | HEART RATE: 76 BPM | HEIGHT: 71 IN | SYSTOLIC BLOOD PRESSURE: 122 MMHG | BODY MASS INDEX: 31.58 KG/M2 | WEIGHT: 225.6 LBS | DIASTOLIC BLOOD PRESSURE: 80 MMHG

## 2019-04-02 DIAGNOSIS — F41.8 ANXIETY WITH DEPRESSION: ICD-10-CM

## 2019-04-02 DIAGNOSIS — I10 ESSENTIAL HYPERTENSION: Primary | ICD-10-CM

## 2019-04-02 DIAGNOSIS — Z12.5 SCREENING FOR PROSTATE CANCER: ICD-10-CM

## 2019-04-02 DIAGNOSIS — J44.9 CHRONIC OBSTRUCTIVE PULMONARY DISEASE, UNSPECIFIED COPD TYPE (HCC): ICD-10-CM

## 2019-04-02 DIAGNOSIS — F33.9 RECURRENT MAJOR DEPRESSIVE DISORDER, REMISSION STATUS UNSPECIFIED (HCC): ICD-10-CM

## 2019-04-02 DIAGNOSIS — R73.01 IMPAIRED FASTING GLUCOSE: ICD-10-CM

## 2019-04-02 DIAGNOSIS — J30.9 ALLERGIC RHINITIS, UNSPECIFIED SEASONALITY, UNSPECIFIED TRIGGER: ICD-10-CM

## 2019-04-02 DIAGNOSIS — E78.2 HYPERLIPEMIA, MIXED: ICD-10-CM

## 2019-04-02 PROCEDURE — 99214 OFFICE O/P EST MOD 30 MIN: CPT | Performed by: FAMILY MEDICINE

## 2019-04-02 PROCEDURE — 3074F SYST BP LT 130 MM HG: CPT | Performed by: FAMILY MEDICINE

## 2019-04-02 PROCEDURE — 1036F TOBACCO NON-USER: CPT | Performed by: FAMILY MEDICINE

## 2019-04-02 PROCEDURE — 3079F DIAST BP 80-89 MM HG: CPT | Performed by: FAMILY MEDICINE

## 2019-04-02 PROCEDURE — 3008F BODY MASS INDEX DOCD: CPT | Performed by: FAMILY MEDICINE

## 2019-04-02 PROCEDURE — 1160F RVW MEDS BY RX/DR IN RCRD: CPT | Performed by: FAMILY MEDICINE

## 2019-04-02 RX ORDER — ALPRAZOLAM 0.25 MG/1
0.25 TABLET ORAL 2 TIMES DAILY PRN
Qty: 120 TABLET | Refills: 2 | Status: SHIPPED | OUTPATIENT
Start: 2019-04-02 | End: 2019-10-12 | Stop reason: SDUPTHER

## 2019-04-02 RX ORDER — HYDROCHLOROTHIAZIDE 12.5 MG/1
12.5 TABLET ORAL DAILY
Qty: 90 TABLET | Refills: 3 | Status: SHIPPED | OUTPATIENT
Start: 2019-04-02 | End: 2020-05-13 | Stop reason: SDUPTHER

## 2019-04-02 RX ORDER — BENAZEPRIL HYDROCHLORIDE 20 MG/1
20 TABLET ORAL DAILY
Qty: 90 TABLET | Refills: 3 | Status: SHIPPED | OUTPATIENT
Start: 2019-04-02 | End: 2020-05-13 | Stop reason: SDUPTHER

## 2019-06-04 ENCOUNTER — TELEPHONE (OUTPATIENT)
Dept: FAMILY MEDICINE CLINIC | Facility: CLINIC | Age: 69
End: 2019-06-04

## 2019-06-04 DIAGNOSIS — M72.0 DUPUYTREN'S CONTRACTURE: Primary | ICD-10-CM

## 2019-07-12 ENCOUNTER — CONSULT (OUTPATIENT)
Dept: OBGYN CLINIC | Facility: HOSPITAL | Age: 69
End: 2019-07-12
Payer: COMMERCIAL

## 2019-07-12 VITALS
DIASTOLIC BLOOD PRESSURE: 80 MMHG | SYSTOLIC BLOOD PRESSURE: 134 MMHG | WEIGHT: 225 LBS | BODY MASS INDEX: 31.5 KG/M2 | HEIGHT: 71 IN | HEART RATE: 84 BPM

## 2019-07-12 DIAGNOSIS — M72.0 DUPUYTREN'S CONTRACTURE: ICD-10-CM

## 2019-07-12 PROCEDURE — 99243 OFF/OP CNSLTJ NEW/EST LOW 30: CPT | Performed by: ORTHOPAEDIC SURGERY

## 2019-07-12 NOTE — PROGRESS NOTES
ASSESSMENT/PLAN:    Assessment:   Dupuytrens Disease of the  bilateral  hand    Plan:   We did discuss with the patient that he could treat his dupuytren's with Xiaflex with an 18% reoccurrence rate, percutaneous dupuytren's release with 30% reoccurrence rate or dupuytren's excision with 18% reoccurrence rate  The patient would like to proceed with Xiaflex release to his right hand small finger at this time  We will visit his left hand with possible Xiaflex/surgery  Follow Up:  patient will be called when Moises Medley arrives in the office    To Do Next Visit:       General Discussions:  Dupuytren's Disease: The anatomy and physiology of Dupuytren's disease were discussed with the patient today in the office  Increased scar formation within the interval between the skin volarly and the flexor tendons dorsally can result in pit formation, nodular formation, or eventual cord formation  These pathologic cords can cause contracture at either the metacarpophalangeal joint, proximal interphalangeal joint, or both  As the cords progress towards the proximal interphalangeal joint, the neurovascular structures of the finger may become involved within the disease process  While this is a genetic condition, variable penetrance occurs within family trees  Conservative treatment options including therapy to maintain joint mobility and a tabletop test were discussed  Other treatments include Xiaflex and possible surgical intervention  Xiaflex: The process of receiving Xiaflex injection was discussed with the patient today  This includes 3-4 injections in the area of the affected cord  The hand will then be bandaged for 2-3 consecutive days, at which time the patient will return to the office for manipulation of the affected finger  Risks, benefits and alternatives of Xiaflex injections were discussed with the patient today   Risks include bleeding, infection, pain, swelling, bruising, itching, breaks in the skin, redness/warmth of the skin, allergic reaction, tendon rupture, ligament damage, and nerve/vessel injury  Patient agrees to proceed with the injection  Authorization process will be initiated with patient to follow up once this is achieved  Operative Discussions:         _____________________________________________________  CHIEF COMPLAINT:  Chief Complaint   Patient presents with    Left Hand - Pain    Right Hand - Pain         SUBJECTIVE:  Kamran Vergara is a RHD 81 y o  male who presents with Stiffness/LROM to the bilateral hand  This started  20  year(s) ago as Chronic  He did have his Left small finger operated on 20 years ago however his cord has returned  Patient is an  and notices difficulties typing on a computer     Radiation: Yes to the  hand  Previous Treatments: activity modification without relief  Associated symptoms: No Complaints    PAST MEDICAL HISTORY:  Past Medical History:   Diagnosis Date    Benign colon polyp     last assessed: 6/19/2013    Benign essential hypertension 11/08/2011    Benazepril, HCTZ    Chronic rhinitis 11/08/2011    zyrtec, nasal saline    Depression 01/18/2010    continue Wellbutrin SL, doing well    Disorder of male genital organs 01/18/2010    refer to uro Dr Lidia Michael Multiple benign polyps of large intestine     Neoplasm of uncertain behavior of skin 09/11/2009    refer to derm    Testis mass 09/11/2009    (___cm)  U/S scrotum       PAST SURGICAL HISTORY:  Past Surgical History:   Procedure Laterality Date    HAND SURGERY      TONSILLECTOMY      WISDOM TOOTH EXTRACTION         FAMILY HISTORY:  Family History   Problem Relation Age of Onset    Cancer Mother     Depression Mother     Bone cancer Mother     Diabetes Mother         mellitus    No Known Problems Father     Diabetes Family         mellitus    Substance Abuse Neg Hx        SOCIAL HISTORY:  Social History     Tobacco Use    Smoking status: Former Smoker     Last attempt to quit: 2014     Years since quittin 4    Smokeless tobacco: Never Used   Substance Use Topics    Alcohol use: Yes     Comment: 1-2 beers a night     Drug use: No       MEDICATIONS:    Current Outpatient Medications:     albuterol (VENTOLIN HFA) 90 mcg/act inhaler, Inhale, Disp: , Rfl:     ALPRAZolam (XANAX) 0 25 mg tablet, Take 1 tablet (0 25 mg total) by mouth 2 (two) times a day as needed for anxiety, Disp: 120 tablet, Rfl: 2    aspirin 81 MG tablet, Take 1 tablet by mouth daily, Disp: , Rfl:     benazepril (LOTENSIN) 20 mg tablet, Take 1 tablet (20 mg total) by mouth daily, Disp: 90 tablet, Rfl: 3    buPROPion (WELLBUTRIN XL) 300 mg 24 hr tablet, TAKE 1 TABLET BY MOUTH     DAILY, Disp: 90 tablet, Rfl: 3    Cholecalciferol (VITAMIN D) 2000 units CAPS, Take by mouth, Disp: , Rfl:     fluticasone (FLONASE) 50 mcg/act nasal spray, into each nostril, Disp: , Rfl:     fluticasone-salmeterol (ADVAIR DISKUS) 250-50 mcg/dose inhaler, Inhale 1 puff 2 (two) times a day, Disp: 3 Inhaler, Rfl: 1    hydrochlorothiazide (HYDRODIURIL) 12 5 mg tablet, Take 1 tablet (12 5 mg total) by mouth daily, Disp: 90 tablet, Rfl: 3    loratadine (CLARITIN) 10 mg tablet, Take 10 mg by mouth daily, Disp: , Rfl:     Multiple Vitamins-Minerals (CENTRUM SILVER) CHEW, Chew, Disp: , Rfl:     Omega-3 Fatty Acids (FISH OIL) 645 MG CAPS, Take by mouth, Disp: , Rfl:     omeprazole (PriLOSEC) 20 mg delayed release capsule, Take 1 capsule by mouth, Disp: , Rfl:     EPINEPHrine (EPIPEN 2-GABBY) 0 3 mg/0 3 mL SOAJ, Inject 0 3 mL (0 3 mg total) into the shoulder, thigh, or buttocks once for 1 dose, Disp: 2 each, Rfl: 3    ALLERGIES:  Allergies   Allergen Reactions    Bee Venom Anaphylaxis    Latex     Other Allergic Rhinitis     Seasonal        REVIEW OF SYSTEMS:  Pertinent items are noted in HPI      LABS:  HgA1c: No results found for: HGBA1C  BMP:   Lab Results   Component Value Date    CALCIUM 9 2 2018    K 4 2 2018 CO2 30 12/12/2018     12/12/2018    BUN 20 12/12/2018    CREATININE 1 25 12/12/2018         _____________________________________________________  PHYSICAL EXAMINATION:  Vital signs: /80   Pulse 84   Ht 5' 11" (1 803 m)   Wt 102 kg (225 lb)   BMI 31 38 kg/m²   General: well developed and well nourished, alert, oriented times 3 and appears comfortable  Psychiatric: Normal  HEENT: Trachea Midline, No torticollis  Cardiovascular: No discernable arrhythmia  Pulmonary: No wheezing or stridor  Skin: No Erythema  Neurovascular: Sensation Intact to the Median, Ulnar, Radial Nerve, Motor Intact to the Median, Ulnar, Radial Nerve and Pulses Intact    MUSCULOSKELETAL EXAMINATION:  LEFT SIDE:  Index finger para digital sheath to radial aspect of PIP jt, central cord extending to a spiral cord to ulnar aspect of long finger with a nodule involving paradigital sheath to ulnar side of long, small finger central cord extending to radial and ulnar side and ulnar digital sheath to PIP jt, there is a small adductor cord to the small finger and thumb as well  Index finger pip joint contracture of 15 degrees, long finger MP jt contracture of 46 degrees, small finger MP 30 degrees, PIP 63 and DIP 37 degrees  RIGHT SIDE:  Central cord extending to spiral cord to ulnar side of long finger with MP jt contracture of 33 degrees, small finger central cord extending to spiral to ulnar side with MP contacture of 20 degrees and PIP jt of 25 degrees, index finger paradigital sheath to ulnar side affecting PIP jt with a 10 degree contracture      _____________________________________________________  STUDIES REVIEWED:  No Studies to review      PROCEDURES PERFORMED:  Procedures  No Procedures performed today   Scribe Attestation    I,:   Shiraz Hernandez am acting as a scribe while in the presence of the attending physician :        I,:   Lon Delarosa MD personally performed the services described in this documentation    as scribed in my presence :

## 2019-07-12 NOTE — PATIENT INSTRUCTIONS
What is Dupuytren's Disease? Dupuytren's disease is an abnormal thickening of the tissue just beneath the skin  This thickening occurs in the palm and can extend into the fingers (see Figure 1)  Firm pits, nodules, and cords may develop that can cause the fingers to bend into the palm (see Figure 2), which is a condition described as Dupuytren's contracture  Although the skin may become involved in the process, the deeper structures--such as the tendons--are not directly involved  Occasionally, the disease will cause thickening on top of the finger knuckles (knuckle pads), or lumps or cords within the soles of the feet (plantar fibromatosis)  Causes  The cause of Dupuytren's disease is unknown but may be associated with certain biochemical factors within the involved tissue  The problem is more common in men over age 36 and in people of [place] descent  There is no proven evidence that hand injuries or specific occupational exposures lead to a higher risk of developing Dupuytren's disease  Signs and Symptoms  Symptoms of Dupuytren's disease usually include lumps and pits within the palm  The lumps are generally firm and adherent to the skin  Thick cords may develop, extending from the palm into one or more fingers, with the ring and little fingers most commonly affected  These cords may be mistaken for tendons, but they actually lie between the skin and the tendons  These cords cause bending or contractures of the fingers  In many cases, both hands are affected, although the degree of involvement may vary  The initial lumps may produce discomfort that usually resolves, but Dupuytren's disease is not typically painful  The disease may first be noticed because of difficulty placing the hand flat on an even surface, such as a tabletop (see Figure 3)   As the tissue thickens and fingers are drawn into the palm, one may notice increasing difficulty with activities such as washing, wearing gloves, shaking hands, and putting hands into pockets  Progression is unpredictable  Some individuals will have only small lumps or cords while others will develop severely bent fingers  More severe disease often occurs with an earlier age of onset  Treatment  In mild cases, especially if hand function is not affected, only observation is needed  For more severe cases, various treatment options are available in order to straighten the finger(s)  These options may include needles or open surgery  Your treating surgeon will discuss the method most appropriate for your condition based upon the stage and pattern of the disease and the joints involved  The goal of treatment is to improve finger position and thereby hand function  Despite treatment, the disease process may recur  Before treatment, your treating surgeon will discuss realistic goals, possible risks and results  Specific surgical considerations include the following:   The presence of a lump in the palm does not mean that surgery is required or that the disease will progress   Correction of finger position is best accomplished with milder contractures or contractures that affect the base of the finger  Complete correction sometimes cannot be attained, especially of the middle and end joints in the finger   Skin grafts are sometimes required to cover open areas in the fingers if the skin is deficient   The nerves that provide feeling to the fingertips are often intertwined with the cords   Splinting and hand therapy are often required after surgery in order to maximize and maintain the improvement in finger position and function  © 2012 American [employer] for [employer] of the Hand  www handcare  org

## 2019-08-27 DIAGNOSIS — Z91.030 BEE STING ALLERGY: ICD-10-CM

## 2019-08-27 RX ORDER — EPINEPHRINE 0.3 MG/.3ML
0.3 INJECTION SUBCUTANEOUS ONCE
Qty: 2 EACH | Refills: 3 | Status: SHIPPED | OUTPATIENT
Start: 2019-08-27 | End: 2019-10-15 | Stop reason: SDUPTHER

## 2019-09-04 DIAGNOSIS — F41.8 ANXIETY WITH DEPRESSION: ICD-10-CM

## 2019-09-04 RX ORDER — BUPROPION HYDROCHLORIDE 300 MG/1
TABLET ORAL
Qty: 90 TABLET | Refills: 0 | Status: SHIPPED | OUTPATIENT
Start: 2019-09-04 | End: 2019-11-22 | Stop reason: SDUPTHER

## 2019-09-20 ENCOUNTER — OFFICE VISIT (OUTPATIENT)
Dept: OBGYN CLINIC | Facility: HOSPITAL | Age: 69
End: 2019-09-20
Payer: COMMERCIAL

## 2019-09-20 VITALS
BODY MASS INDEX: 32.62 KG/M2 | WEIGHT: 233 LBS | HEART RATE: 71 BPM | HEIGHT: 71 IN | DIASTOLIC BLOOD PRESSURE: 89 MMHG | SYSTOLIC BLOOD PRESSURE: 147 MMHG

## 2019-09-20 DIAGNOSIS — M72.0 DUPUYTREN'S CONTRACTURE: Primary | ICD-10-CM

## 2019-09-20 PROCEDURE — 20527 NJX NZM PALMAR FASCIAL CORD: CPT | Performed by: ORTHOPAEDIC SURGERY

## 2019-09-20 PROCEDURE — 99213 OFFICE O/P EST LOW 20 MIN: CPT | Performed by: ORTHOPAEDIC SURGERY

## 2019-09-20 NOTE — PROGRESS NOTES
ASSESSMENT/PLAN:    Assessment:   Dupuytrens Disease of the  bilateral  hand    Plan:   The patient will be given a Xiaflex injection into right long and small finger today  The patient will be wrapped in a bulky dressing that he/she must keep on and dry for the next 5 days  She/he was advised to avoid any heavy activities over the next 5 days however light activities such as eating, drinking and dressing are okay  The patient was advised that he/she might experience some swelling, bruising or itching into the elbow or armpit and that all these symptoms are normal  At his/her follow up he/she was instructed that they will be given a numbing injection and a manipulation will be performed  He/she will then be fit with a splint that he will wear at night time for the next 3 months  Patient was advised to take tylenol if needed for pain  Follow Up:  wednesday     To Do Next Visit:   xiaflex manipulation    General Discussions:     Xiaflex: The process of receiving Xiaflex injection was discussed with the patient today  This includes 3-4 injections in the area of the affected cord  The hand will then be bandaged for 2-3 consecutive days, at which time the patient will return to the office for manipulation of the affected finger  Risks, benefits and alternatives of Xiaflex injections were discussed with the patient today  Risks include bleeding, infection, pain, swelling, bruising, itching, breaks in the skin, redness/warmth of the skin, allergic reaction, tendon rupture, ligament damage, and nerve/vessel injury  Patient agrees to proceed with the injection  Authorization process will be initiated with patient to follow up once this is achieved        Operative Discussions:         _____________________________________________________  CHIEF COMPLAINT:  Chief Complaint   Patient presents with    Right Little Finger - Follow-up         SUBJECTIVE:  Elbert Nicholson is a 81 y o  male who presents for follow up regarding Dupuytrens Disease of the  bilateral  hand  Since last visit, Nelli Terrell has tried activity modification without relief  Today there is Stiffness/LROM to the right hand      Radiation: Yes to the  hand  Associated symptoms: No Complaints    PAST MEDICAL HISTORY:  Past Medical History:   Diagnosis Date    Benign colon polyp     last assessed: 2013    Benign essential hypertension 2011    Benazepril, HCTZ    Chronic rhinitis 2011    zyrtec, nasal saline    Depression 2010    continue Wellbutrin SL, doing well    Disorder of male genital organs 2010    refer to uro Dr Elsie Greco Multiple benign polyps of large intestine     Neoplasm of uncertain behavior of skin 2009    refer to derm    Testis mass 2009    (___cm)  U/S scrotum       PAST SURGICAL HISTORY:  Past Surgical History:   Procedure Laterality Date    HAND SURGERY      TONSILLECTOMY      WISDOM TOOTH EXTRACTION         FAMILY HISTORY:  Family History   Problem Relation Age of Onset   [employer] Cancer Mother     Depression Mother     Bone cancer Mother     Diabetes Mother         mellitus    No Known Problems Father     Diabetes Family         mellitus    Substance Abuse Neg Hx        SOCIAL HISTORY:  Social History     Tobacco Use    Smoking status: Former Smoker     Last attempt to quit: 2014     Years since quittin 6    Smokeless tobacco: Never Used   Substance Use Topics    Alcohol use: Yes     Comment: 1-2 beers a night     Drug use: No       MEDICATIONS:    Current Outpatient Medications:     albuterol (VENTOLIN HFA) 90 mcg/act inhaler, Inhale, Disp: , Rfl:     ALPRAZolam (XANAX) 0 25 mg tablet, Take 1 tablet (0 25 mg total) by mouth 2 (two) times a day as needed for anxiety, Disp: 120 tablet, Rfl: 2    aspirin 81 MG tablet, Take 1 tablet by mouth daily, Disp: , Rfl:     benazepril (LOTENSIN) 20 mg tablet, Take 1 tablet (20 mg total) by mouth daily, Disp: 90 tablet, Rfl: 3    buPROPion (WELLBUTRIN XL) 300 mg 24 hr tablet, TAKE 1 TABLET DAILY, Disp: 90 tablet, Rfl: 0    Cholecalciferol (VITAMIN D) 2000 units CAPS, Take by mouth, Disp: , Rfl:     EPINEPHrine (EPIPEN) 0 3 mg/0 3 mL SOAJ, INJECT 0 3 ML (0 3 MG TOTAL) INTO THE SHOULDER, THIGH, OR BUTTOCKS ONCE FOR 1 DOSE, Disp: 2 each, Rfl: 3    fluticasone (FLONASE) 50 mcg/act nasal spray, into each nostril, Disp: , Rfl:     fluticasone-salmeterol (ADVAIR DISKUS) 250-50 mcg/dose inhaler, Inhale 1 puff 2 (two) times a day, Disp: 3 Inhaler, Rfl: 1    hydrochlorothiazide (HYDRODIURIL) 12 5 mg tablet, Take 1 tablet (12 5 mg total) by mouth daily, Disp: 90 tablet, Rfl: 3    loratadine (CLARITIN) 10 mg tablet, Take 10 mg by mouth daily, Disp: , Rfl:     Multiple Vitamins-Minerals (CENTRUM SILVER) CHEW, Chew, Disp: , Rfl:     Omega-3 Fatty Acids (FISH OIL) 645 MG CAPS, Take by mouth, Disp: , Rfl:     omeprazole (PriLOSEC) 20 mg delayed release capsule, Take 1 capsule by mouth, Disp: , Rfl:     ALLERGIES:  Allergies   Allergen Reactions    Bee Venom Anaphylaxis    Latex     Other Allergic Rhinitis     Seasonal        REVIEW OF SYSTEMS:  Pertinent items are noted in HPI      LABS:  HgA1c: No results found for: HGBA1C  BMP:   Lab Results   Component Value Date    CALCIUM 9 2 12/12/2018    K 4 2 12/12/2018    CO2 30 12/12/2018     12/12/2018    BUN 20 12/12/2018    CREATININE 1 25 12/12/2018           _____________________________________________________  PHYSICAL EXAMINATION:  Vital signs: /89   Pulse 71   Ht 5' 11" (1 803 m)   Wt 106 kg (233 lb)   BMI 32 50 kg/m²   General: well developed and well nourished, alert, oriented times 3 and appears comfortable  Psychiatric: Normal  HEENT: Trachea Midline, No torticollis  Cardiovascular: No discernable arrhythmia  Pulmonary: No wheezing or stridor  Skin: No Erythema  Neurovascular: Sensation Intact to the Median, Ulnar, Radial Nerve, Motor Intact to the Median, Ulnar, Radial Nerve and Pulses Intact    MUSCULOSKELETAL EXAMINATION:  RIGHT SIDE:  Central cord extending to spiral cord to ulnar side of long finger with MP jt contracture of 33 degrees, small finger central cord extending to spiral to ulnar side with MP contacture of 20 degrees and PIP jt of 25 degrees, index finger paradigital sheath to ulnar side affecting PIP jt with a 10 degree contracture      _____________________________________________________  STUDIES REVIEWED:  No Studies to review      PROCEDURES PERFORMED:  Hand/upper extremity injection: R long finger  Date/Time: 9/20/2019 9:56 AM  Consent given by: patient  Site marked: site marked  Supporting Documentation  Indications: therapeutic   Procedure Details  Condition:Dupuytren's contracture Dupuytren's Contracture Procedure: Dupuytren's contracture injectionSite: R long finger   Medications administered: 0 58 mg collagenase clostridium histolyticum 0 9 MG    Patient tolerance: patient tolerated the procedure well with no immediate complications  Dressing:  Sterile dressing applied     Hand/upper extremity injection: R small finger  Date/Time: 9/20/2019 9:57 AM  Consent given by: patient  Site marked: site marked  Supporting Documentation  Indications: therapeutic   Procedure Details  Condition:Dupuytren's contracture Dupuytren's Contracture Procedure: Dupuytren's contracture injectionSite: R small finger   Specialty Pharmacy Supplied: received medications from pharmacyMedications administered: 0 58 mg collagenase clostridium histolyticum 0 9 MG    Patient tolerance: patient tolerated the procedure well with no immediate complications  Dressing:  Sterile dressing applied             Scribe Attestation    I,:   Lauren Ellsworth am acting as a scribe while in the presence of the attending physician :        I,:   Thad Dietz MD personally performed the services described in this documentation    as scribed in my presence :

## 2019-09-20 NOTE — PATIENT INSTRUCTIONS
Plan:   The patient will be given a Xiaflex injection into right long and small finger today  The patient will be wrapped in a bulky dressing that he/she must keep on and dry for the next 5 days  She/he was advised to avoid any heavy activities over the next 5 days however light activities such as eating, drinking and dressing are okay  The patient was advised that he/she might experience some swelling, bruising or itching into the elbow or armpit and that all these symptoms are normal  At his/her follow up he/she was instructed that they will be given a numbing injection and a manipulation will be performed  He/she will then be fit with a splint that he will wear at night time for the next 3 months  Patient was advised to take tylenol if needed for pain

## 2019-09-25 ENCOUNTER — OFFICE VISIT (OUTPATIENT)
Dept: OBGYN CLINIC | Facility: HOSPITAL | Age: 69
End: 2019-09-25
Payer: COMMERCIAL

## 2019-09-25 ENCOUNTER — OFFICE VISIT (OUTPATIENT)
Dept: OCCUPATIONAL THERAPY | Facility: HOSPITAL | Age: 69
End: 2019-09-25
Attending: ORTHOPAEDIC SURGERY
Payer: COMMERCIAL

## 2019-09-25 VITALS
DIASTOLIC BLOOD PRESSURE: 90 MMHG | HEART RATE: 75 BPM | BODY MASS INDEX: 32.06 KG/M2 | WEIGHT: 229 LBS | HEIGHT: 71 IN | SYSTOLIC BLOOD PRESSURE: 147 MMHG

## 2019-09-25 DIAGNOSIS — M72.0 DUPUYTREN'S CONTRACTURE: Primary | ICD-10-CM

## 2019-09-25 DIAGNOSIS — M72.0 DUPUYTREN'S CONTRACTURE: ICD-10-CM

## 2019-09-25 PROCEDURE — 26341 MANIPULAT PALM CORD POST INJ: CPT | Performed by: ORTHOPAEDIC SURGERY

## 2019-09-25 PROCEDURE — L3913 HFO W/O JOINTS CF: HCPCS

## 2019-09-25 PROCEDURE — 99213 OFFICE O/P EST LOW 20 MIN: CPT | Performed by: ORTHOPAEDIC SURGERY

## 2019-09-25 RX ORDER — LIDOCAINE HYDROCHLORIDE 10 MG/ML
3 INJECTION, SOLUTION INFILTRATION; PERINEURAL
Status: COMPLETED | OUTPATIENT
Start: 2019-09-25 | End: 2019-09-25

## 2019-09-25 RX ORDER — LIDOCAINE HYDROCHLORIDE 10 MG/ML
2 INJECTION, SOLUTION INFILTRATION; PERINEURAL
Status: COMPLETED | OUTPATIENT
Start: 2019-09-25 | End: 2019-09-25

## 2019-09-25 RX ADMIN — LIDOCAINE HYDROCHLORIDE 3 ML: 10 INJECTION, SOLUTION INFILTRATION; PERINEURAL at 11:12

## 2019-09-25 RX ADMIN — LIDOCAINE HYDROCHLORIDE 2 ML: 10 INJECTION, SOLUTION INFILTRATION; PERINEURAL at 11:13

## 2019-09-25 RX ADMIN — LIDOCAINE HYDROCHLORIDE 2 ML: 10 INJECTION, SOLUTION INFILTRATION; PERINEURAL at 11:12

## 2019-09-25 RX ADMIN — LIDOCAINE HYDROCHLORIDE 3 ML: 10 INJECTION, SOLUTION INFILTRATION; PERINEURAL at 11:13

## 2019-09-25 NOTE — PROGRESS NOTES
ASSESSMENT/PLAN:    Assessment:   Dupuytren's disease of bilateral hands    Plan:   The patient was given a Xiaflex manipulation today  He/she was instructed that they do not have any formal restrictions at this time and may resume activities as tolerated  The patient will meet with the OT in the office today and be fit in a custom extension splint to wear at night time for the next 2-3 months  Follow Up:  4  week(s)    To Do Next Visit:       General Discussions:     Xiaflex: The process of receiving Xiaflex injection was discussed with the patient today  This includes 3-4 injections in the area of the affected cord  The hand will then be bandaged for 2-3 consecutive days, at which time the patient will return to the office for manipulation of the affected finger  Risks, benefits and alternatives of Xiaflex injections were discussed with the patient today  Risks include bleeding, infection, pain, swelling, bruising, itching, breaks in the skin, redness/warmth of the skin, allergic reaction, tendon rupture, ligament damage, and nerve/vessel injury  Patient agrees to proceed with the injection  Authorization process will be initiated with patient to follow up once this is achieved  Operative Discussions:         _____________________________________________________  CHIEF COMPLAINT:  Chief Complaint   Patient presents with    Right Hand - Follow-up         SUBJECTIVE:  Galilea Ashton is a 81 y o  male who presents for follow up regarding Dupuytrens Disease of the  left  hand  Since last visit, Galilea Ashton has tried Xiaflex injection to right long and small finger without relief  Today there is Pain  Mild  Intermittant  Aching to the right hand      Radiation: None  Associated symptoms:  Ecchymosis to the palm of right hand and small and ring finger proximal phalanx    PAST MEDICAL HISTORY:  Past Medical History:   Diagnosis Date    Benign colon polyp     last assessed: 6/19/2013    Benign essential hypertension 2011    Benazepril, HCTZ    Chronic rhinitis 2011    zyrtec, nasal saline    Depression 2010    continue Wellbutrin SL, doing well    Disorder of male genital organs 2010    refer to uro Dr Tanesha Wilson Multiple benign polyps of large intestine     Neoplasm of uncertain behavior of skin 2009    refer to derm    Testis mass 2009    (___cm)  U/S scrotum       PAST SURGICAL HISTORY:  Past Surgical History:   Procedure Laterality Date    HAND SURGERY      TONSILLECTOMY      WISDOM TOOTH EXTRACTION         FAMILY HISTORY:  Family History   Problem Relation Age of Onset   [employer] Cancer Mother     Depression Mother     Bone cancer Mother     Diabetes Mother         mellitus    No Known Problems Father     Diabetes Family         mellitus    Substance Abuse Neg Hx        SOCIAL HISTORY:  Social History     Tobacco Use    Smoking status: Former Smoker     Last attempt to quit: 2014     Years since quittin 6    Smokeless tobacco: Never Used   Substance Use Topics    Alcohol use: Yes     Comment: 1-2 beers a night     Drug use: No       MEDICATIONS:    Current Outpatient Medications:     albuterol (VENTOLIN HFA) 90 mcg/act inhaler, Inhale, Disp: , Rfl:     ALPRAZolam (XANAX) 0 25 mg tablet, Take 1 tablet (0 25 mg total) by mouth 2 (two) times a day as needed for anxiety, Disp: 120 tablet, Rfl: 2    aspirin 81 MG tablet, Take 1 tablet by mouth daily, Disp: , Rfl:     benazepril (LOTENSIN) 20 mg tablet, Take 1 tablet (20 mg total) by mouth daily, Disp: 90 tablet, Rfl: 3    buPROPion (WELLBUTRIN XL) 300 mg 24 hr tablet, TAKE 1 TABLET DAILY, Disp: 90 tablet, Rfl: 0    Cholecalciferol (VITAMIN D) 2000 units CAPS, Take by mouth, Disp: , Rfl:     fluticasone (FLONASE) 50 mcg/act nasal spray, into each nostril, Disp: , Rfl:     fluticasone-salmeterol (ADVAIR DISKUS) 250-50 mcg/dose inhaler, Inhale 1 puff 2 (two) times a day, Disp: 3 Inhaler, Rfl: 1   hydrochlorothiazide (HYDRODIURIL) 12 5 mg tablet, Take 1 tablet (12 5 mg total) by mouth daily, Disp: 90 tablet, Rfl: 3    loratadine (CLARITIN) 10 mg tablet, Take 10 mg by mouth daily, Disp: , Rfl:     Multiple Vitamins-Minerals (CENTRUM SILVER) CHEW, Chew, Disp: , Rfl:     Omega-3 Fatty Acids (FISH OIL) 645 MG CAPS, Take by mouth, Disp: , Rfl:     omeprazole (PriLOSEC) 20 mg delayed release capsule, Take 1 capsule by mouth, Disp: , Rfl:     EPINEPHrine (EPIPEN) 0 3 mg/0 3 mL SOAJ, INJECT 0 3 ML (0 3 MG TOTAL) INTO THE SHOULDER, THIGH, OR BUTTOCKS ONCE FOR 1 DOSE, Disp: 2 each, Rfl: 3    ALLERGIES:  Allergies   Allergen Reactions    Bee Venom Anaphylaxis    Latex     Other Allergic Rhinitis     Seasonal        REVIEW OF SYSTEMS:  Pertinent items are noted in HPI      LABS:  HgA1c: No results found for: HGBA1C  BMP:   Lab Results   Component Value Date    CALCIUM 9 2 12/12/2018    K 4 2 12/12/2018    CO2 30 12/12/2018     12/12/2018    BUN 20 12/12/2018    CREATININE 1 25 12/12/2018           _____________________________________________________  PHYSICAL EXAMINATION:  Vital signs: /90   Pulse 75   Ht 5' 11" (1 803 m)   Wt 104 kg (229 lb)   BMI 31 94 kg/m²   General: well developed and well nourished, alert, oriented times 3 and appears comfortable  Psychiatric: Normal  HEENT: Trachea Midline, No torticollis  Cardiovascular: No discernable arrhythmia  Pulmonary: No wheezing or stridor  Skin: No Erythema, No Lacerations  Neurovascular: Sensation Intact to the Median, Ulnar, Radial Nerve, Motor Intact to the Median, Ulnar, Radial Nerve and Pulses Intact    MUSCULOSKELETAL EXAMINATION:  RIGHT SIDE:  Central cord extending to spiral cord to ulnar side of long finger with MP jt contracture of 33 degrees, small finger central cord extending to spiral to ulnar side with MP contacture of 20 degrees and PIP jt of 25 degrees, index finger paradigital sheath to ulnar side affecting PIP jt with a 10 degree contracture  Ecchymosis to palm of right hand and proximal phalanx of small and ring       After manipulation: small finger MP jt comes to neutral  long finger MP jt hyperextends 20 degrees, we did not address PIP jts     _____________________________________________________  STUDIES REVIEWED:  No Studies to review      PROCEDURES PERFORMED:  Hand/upper extremity injection: R long finger  Date/Time: 9/25/2019 11:12 AM  Consent given by: patient  Site marked: site marked  Supporting Documentation  Indications: therapeutic   Procedure Details  Condition:Dupuytren's contracture Dupuytren's Contracture Procedure: Dupuytren's contracture manipulationSite: R long finger   Medications administered: 3 mL lidocaine 1 %; 2 mL lidocaine 1 %    Patient tolerance: patient tolerated the procedure well with no immediate complications  Dressing:  Sterile dressing applied     Hand/upper extremity injection: R small finger  Date/Time: 9/25/2019 11:13 AM  Consent given by: patient  Site marked: site marked  Supporting Documentation  Indications: therapeutic   Procedure Details  Condition:Dupuytren's contracture Dupuytren's Contracture Procedure: Dupuytren's contracture manipulationSite: R small finger   Medications administered: 3 mL lidocaine 1 %; 2 mL lidocaine 1 %    Patient tolerance: patient tolerated the procedure well with no immediate complications  Dressing:  Sterile dressing applied             Scribe Attestation    I,:   Rodney Drew am acting as a scribe while in the presence of the attending physician :        I,:   Radha Weeks MD personally performed the services described in this documentation    as scribed in my presence :

## 2019-09-25 NOTE — PROGRESS NOTES
Orthosis    Diagnosis:   1  Dupuytren's contracture  Ambulatory referral to PT/OT hand therapy     Indication: Motion Blocking    Location: Right  hand, long finger, ring finger and small finger  Supplies: Custom Fit Orthotic  Orthosis type: Volar Extension Splint  Wearing Schedule: Night only  Describe Position: Full extension of all digits included in splint    Precautions: Universal (skin contact/breakdown) and Xiaflex Injection    Patient or Caregiver expresses understanding of wearing Schedule and Precautions? Yes  Patient or Caregiver able to don/doff orthotic independently? Yes    Written orders provided to patient?  No  Orders Obtained: Verbal  Orders Obtained from: Niya Marrero    Return for evaluation and treatment No

## 2019-09-25 NOTE — PATIENT INSTRUCTIONS
Plan:   The patient was given a Xiaflex manipulation today  He/she was instructed that they do not have any formal restrictions at this time and may resume activities as tolerated  The patient will meet with the OT in the office today and be fit in a custom extension splint to wear at night time for the next 2-3 months

## 2019-10-12 DIAGNOSIS — F41.8 ANXIETY WITH DEPRESSION: ICD-10-CM

## 2019-10-14 RX ORDER — ALPRAZOLAM 0.25 MG/1
TABLET ORAL
Qty: 120 TABLET | Refills: 0 | Status: SHIPPED | OUTPATIENT
Start: 2019-10-14 | End: 2020-01-31

## 2019-10-15 ENCOUNTER — OFFICE VISIT (OUTPATIENT)
Dept: FAMILY MEDICINE CLINIC | Facility: CLINIC | Age: 69
End: 2019-10-15
Payer: COMMERCIAL

## 2019-10-15 VITALS
BODY MASS INDEX: 31.36 KG/M2 | DIASTOLIC BLOOD PRESSURE: 82 MMHG | SYSTOLIC BLOOD PRESSURE: 122 MMHG | HEIGHT: 71 IN | RESPIRATION RATE: 16 BRPM | HEART RATE: 80 BPM | WEIGHT: 224 LBS

## 2019-10-15 DIAGNOSIS — J44.9 CHRONIC OBSTRUCTIVE PULMONARY DISEASE, UNSPECIFIED COPD TYPE (HCC): ICD-10-CM

## 2019-10-15 DIAGNOSIS — E78.2 HYPERLIPEMIA, MIXED: ICD-10-CM

## 2019-10-15 DIAGNOSIS — F33.9 RECURRENT MAJOR DEPRESSIVE DISORDER, REMISSION STATUS UNSPECIFIED (HCC): ICD-10-CM

## 2019-10-15 DIAGNOSIS — R73.01 IMPAIRED FASTING GLUCOSE: ICD-10-CM

## 2019-10-15 DIAGNOSIS — J30.9 ALLERGIC RHINITIS, UNSPECIFIED SEASONALITY, UNSPECIFIED TRIGGER: ICD-10-CM

## 2019-10-15 DIAGNOSIS — Z23 NEED FOR PROPHYLACTIC VACCINATION AND INOCULATION AGAINST INFLUENZA: ICD-10-CM

## 2019-10-15 DIAGNOSIS — Z91.030 BEE STING ALLERGY: ICD-10-CM

## 2019-10-15 DIAGNOSIS — I10 ESSENTIAL HYPERTENSION: Primary | ICD-10-CM

## 2019-10-15 PROCEDURE — 3079F DIAST BP 80-89 MM HG: CPT | Performed by: FAMILY MEDICINE

## 2019-10-15 PROCEDURE — 3074F SYST BP LT 130 MM HG: CPT | Performed by: FAMILY MEDICINE

## 2019-10-15 PROCEDURE — 90471 IMMUNIZATION ADMIN: CPT

## 2019-10-15 PROCEDURE — 99214 OFFICE O/P EST MOD 30 MIN: CPT | Performed by: FAMILY MEDICINE

## 2019-10-15 PROCEDURE — 90662 IIV NO PRSV INCREASED AG IM: CPT

## 2019-10-15 RX ORDER — EPINEPHRINE 0.3 MG/.3ML
0.3 INJECTION SUBCUTANEOUS ONCE
Qty: 2 EACH | Refills: 3 | Status: SHIPPED | OUTPATIENT
Start: 2019-10-15 | End: 2019-10-15

## 2019-10-15 NOTE — PROGRESS NOTES
Assessment/Plan:  1  Essential hypertension  - well controlled, continue Benazepril and HCTZ  - POCT ECG    2  Chronic obstructive pulmonary disease, unspecified COPD type ([address] )  - stable, continue Advair 250-50 1 puff twice a day  - Peak flow    3  Recurrent major depressive disorder  - doing well, continue Wellbutrin  ng daily and Xanax as needed    4  BMI 31 0-31 9,adult  - discussed life style changes    5  Bee sting allergy  - EPINEPHrine (EPIPEN) 0 3 mg/0 3 mL SOAJ; Inject 0 3 mL (0 3 mg total) into a muscle once for 1 dose  Dispense: 2 each; Refill: 3    6  Need for prophylactic vaccination and inoculation against influenza  - influenza vaccine, [id number], high-dose, PF 0 5 mL (FLUZONE HIGH-DOSE)    7  Hyperlipemia, mixed  - discussed low fat/ low cholesterol diet and regular exercise, we will call with lipid panel results    8  Impaired fasting glucose  - discussed low sugar/low carb diet    9  Allergic rhinitis  - continue Claritin 10 mg daily and Flonase,doing well    Follow up in 6 months or sooner as needed  The treatment plan was reviewed with the patient  The patient understands and agrees with the treatment plan        Subjective:   Chief Complaint   Patient presents with    Hypertension    COPD    Hyperlipidemia    Impaired fasting glucose    Allergic Rhinitis    Depression      Patient ID: Samir Hill is a 81 y o  male here for follow up visit for his chronic conditions  Patient states he is doing well and has no complaints today  His breathing has been stable on Advair and reports occasional use of his rescue inhaler  His nasal allergy symptoms have been controlled with Claritin and Flonase  For depression/ anxiety he is on Wellbutrin 300 mg daily and doing well, he takes Xanax at bedtime and rarely during the day as needed with good results  Patient had his labs done this am and results are still pending at this time         The following portions of the patient's history were reviewed and updated as appropriate: allergies, current medications, past family history, past medical history, past social history, past surgical history and problem list     Past Medical History:   Diagnosis Date    Benign colon polyp     last assessed: 2013    Benign essential hypertension 2011    Benazepril, HCTZ    Chronic rhinitis 2011    zyrtec, nasal saline    Depression 2010    continue Wellbutrin SL, doing well    Disorder of male genital organs 2010    refer to uro Dr Marc العلي Multiple benign polyps of large intestine     Neoplasm of uncertain behavior of skin 2009    refer to derm    Testis mass 2009    (___cm)  U/S scrotum     Past Surgical History:   Procedure Laterality Date    HAND SURGERY      TONSILLECTOMY      WISDOM TOOTH EXTRACTION       Family History   Problem Relation Age of Onset    Cancer Mother     Depression Mother     Bone cancer Mother     Diabetes Mother         mellitus    No Known Problems Father     Diabetes Family         mellitus    Substance Abuse Neg Hx      Social History     Socioeconomic History    Marital status: Single     Spouse name: Not on file    Number of children: Not on file    Years of education: Not on file    Highest education level: Not on file   Occupational History    Not on file   Social Needs    Financial resource strain: Not on file    Food insecurity:     Worry: Not on file     Inability: Not on file    Transportation needs:     Medical: Not on file     Non-medical: Not on file   Tobacco Use    Smoking status: Former Smoker     Last attempt to quit: 2014     Years since quittin 7    Smokeless tobacco: Never Used   Substance and Sexual Activity    Alcohol use: Yes     Comment: 1-2 beers a night     Drug use: No    Sexual activity: Not on file   Lifestyle    Physical activity:     Days per week: Not on file     Minutes per session: Not on file    Stress: Not on file Relationships    Social connections:     Talks on phone: Not on file     Gets together: Not on file     Attends Alevism service: Not on file     Active member of club or organization: Not on file     Attends meetings of clubs or organizations: Not on file     Relationship status: Not on file    Intimate partner violence:     Fear of current or ex partner: Not on file     Emotionally abused: Not on file     Physically abused: Not on file     Forced sexual activity: Not on file   Other Topics Concern    Not on file   Social History Narrative    Caffeine use-2-3 cups coffee in am    Has smoke detectors    Uses safety equipment-seatbelts       Current Outpatient Medications:     albuterol (VENTOLIN HFA) 90 mcg/act inhaler, Inhale, Disp: , Rfl:     ALPRAZolam (XANAX) 0 25 mg tablet, TAKE 1 TABLET TWICE A DAY  AS NEEDED FOR ANXIETY, Disp: 120 tablet, Rfl: 0    aspirin 81 MG tablet, Take 1 tablet by mouth daily, Disp: , Rfl:     benazepril (LOTENSIN) 20 mg tablet, Take 1 tablet (20 mg total) by mouth daily, Disp: 90 tablet, Rfl: 3    buPROPion (WELLBUTRIN XL) 300 mg 24 hr tablet, TAKE 1 TABLET DAILY, Disp: 90 tablet, Rfl: 0    Cholecalciferol (VITAMIN D) 2000 units CAPS, Take by mouth, Disp: , Rfl:     fluticasone (FLONASE) 50 mcg/act nasal spray, into each nostril, Disp: , Rfl:     fluticasone-salmeterol (ADVAIR DISKUS) 250-50 mcg/dose inhaler, Inhale 1 puff 2 (two) times a day, Disp: 3 Inhaler, Rfl: 1    hydrochlorothiazide (HYDRODIURIL) 12 5 mg tablet, Take 1 tablet (12 5 mg total) by mouth daily, Disp: 90 tablet, Rfl: 3    loratadine (CLARITIN) 10 mg tablet, Take 10 mg by mouth daily, Disp: , Rfl:     Multiple Vitamins-Minerals (CENTRUM SILVER) CHEW, Chew, Disp: , Rfl:     Omega-3 Fatty Acids (FISH OIL) 645 MG CAPS, Take by mouth, Disp: , Rfl:     omeprazole (PriLOSEC) 20 mg delayed release capsule, Take 1 capsule by mouth, Disp: , Rfl:     EPINEPHrine (EPIPEN) 0 3 mg/0 3 mL SOAJ, Inject 0 3 mL (0 3 mg total) into a muscle once for 1 dose, Disp: 2 each, Rfl: 3    Review of Systems   Constitutional: Negative for chills, fatigue, fever and unexpected weight change  HENT: Negative for congestion, postnasal drip and sore throat  Respiratory: Negative for cough, shortness of breath and wheezing  Cardiovascular: Negative for chest pain, palpitations and leg swelling  Gastrointestinal: Negative for abdominal pain, blood in stool, constipation, diarrhea, nausea and vomiting  Genitourinary: Negative for difficulty urinating, frequency, hematuria and urgency  Neurological: Negative for dizziness, syncope, weakness, numbness and headaches  Hematological: Negative for adenopathy  Psychiatric/Behavioral: Negative for dysphoric mood and sleep disturbance  The patient is not nervous/anxious  Objective:    Vitals:    10/15/19 1752   BP: 122/82   BP Location: Left arm   Patient Position: Sitting   Cuff Size: Adult   Pulse: 80   Resp: 16   Weight: 102 kg (224 lb)   Height: 5' 11" (1 803 m)     BP Readings from Last 3 Encounters:   10/15/19 122/82   09/25/19 147/90   09/20/19 147/89     Wt Readings from Last 3 Encounters:   10/15/19 102 kg (224 lb)   09/25/19 104 kg (229 lb)   09/20/19 106 kg (233 lb)      Physical Exam   Constitutional: He is oriented to person, place, and time  He appears well-developed and well-nourished  No distress  Neck: Neck supple  No thyromegaly present  Cardiovascular: Normal rate, regular rhythm and normal heart sounds  No murmur heard  Pulmonary/Chest: Effort normal  No respiratory distress  He has no wheezes  He has no rhonchi  He has no rales  Abdominal: Soft  He exhibits no distension and no mass  There is no tenderness  Musculoskeletal: He exhibits no edema  Lymphadenopathy:     He has no cervical adenopathy  Neurological: He is alert and oriented to person, place, and time  Psychiatric: He has a normal mood and affect   His behavior is normal  Thought content normal       BMI Counseling: Body mass index is 31 24 kg/m²  The BMI is above normal  Nutrition recommendations include consuming healthier snacks, moderation in carbohydrate intake and reducing intake of saturated fat and trans fat  Exercise recommendations include exercising 3-5 times per week

## 2019-10-16 ENCOUNTER — TELEPHONE (OUTPATIENT)
Dept: FAMILY MEDICINE CLINIC | Facility: CLINIC | Age: 69
End: 2019-10-16

## 2019-10-16 LAB
ALBUMIN SERPL-MCNC: 4.3 G/DL (ref 3.6–5.1)
ALBUMIN/GLOB SERPL: 1.6 (CALC) (ref 1–2.5)
ALP SERPL-CCNC: 62 U/L (ref 40–115)
ALT SERPL-CCNC: 18 U/L (ref 9–46)
AST SERPL-CCNC: 17 U/L (ref 10–35)
BILIRUB SERPL-MCNC: 0.6 MG/DL (ref 0.2–1.2)
BUN SERPL-MCNC: 18 MG/DL (ref 7–25)
BUN/CREAT SERPL: ABNORMAL (CALC) (ref 6–22)
CALCIUM SERPL-MCNC: 9.4 MG/DL (ref 8.6–10.3)
CHLORIDE SERPL-SCNC: 100 MMOL/L (ref 98–110)
CHOLEST SERPL-MCNC: 242 MG/DL
CHOLEST/HDLC SERPL: 5.6 (CALC)
CO2 SERPL-SCNC: 31 MMOL/L (ref 20–32)
CREAT SERPL-MCNC: 1.22 MG/DL (ref 0.7–1.25)
GLOBULIN SER CALC-MCNC: 2.7 G/DL (CALC) (ref 1.9–3.7)
GLUCOSE SERPL-MCNC: 105 MG/DL (ref 65–99)
HDLC SERPL-MCNC: 43 MG/DL
LDLC SERPL CALC-MCNC: 166 MG/DL (CALC)
NONHDLC SERPL-MCNC: 199 MG/DL (CALC)
POTASSIUM SERPL-SCNC: 4.3 MMOL/L (ref 3.5–5.3)
PROT SERPL-MCNC: 7 G/DL (ref 6.1–8.1)
PSA SERPL-MCNC: 0.5 NG/ML
SL AMB EGFR AFRICAN AMERICAN: 71 ML/MIN/1.73M2
SL AMB EGFR NON AFRICAN AMERICAN: 61 ML/MIN/1.73M2
SODIUM SERPL-SCNC: 137 MMOL/L (ref 135–146)
TRIGL SERPL-MCNC: 180 MG/DL

## 2019-10-16 PROCEDURE — 93000 ELECTROCARDIOGRAM COMPLETE: CPT | Performed by: FAMILY MEDICINE

## 2019-10-22 ENCOUNTER — TELEPHONE (OUTPATIENT)
Dept: FAMILY MEDICINE CLINIC | Facility: CLINIC | Age: 69
End: 2019-10-22

## 2019-10-22 NOTE — TELEPHONE ENCOUNTER
Pt was informed and would like to start cholesterol medication  He would like it sent to the [facility] in [place]

## 2019-10-22 NOTE — TELEPHONE ENCOUNTER
Please inform patient his cholesterol was elevated 242 ( normal range < 200) and his LDL "bad" cholesterol was 166 ( normal range < 100), his sugar was 105 in pre-diabetes range, his other lab results were normal    I recommend for patient to start medication to lower his cholesterol like Lipitor or Zocor and have his labs rechecked in 3-4 months

## 2019-10-23 DIAGNOSIS — I10 ESSENTIAL HYPERTENSION: ICD-10-CM

## 2019-10-23 DIAGNOSIS — E78.2 HYPERLIPEMIA, MIXED: Primary | ICD-10-CM

## 2019-10-23 RX ORDER — ATORVASTATIN CALCIUM 10 MG/1
10 TABLET, FILM COATED ORAL
Qty: 30 TABLET | Refills: 6 | Status: SHIPPED | OUTPATIENT
Start: 2019-10-23 | End: 2020-04-26

## 2019-10-23 NOTE — TELEPHONE ENCOUNTER
Script for Lipitor 10 mg daily sent to his [facility] pharmacy, please mail patient a lab slip to be done before next visit

## 2019-10-30 ENCOUNTER — OFFICE VISIT (OUTPATIENT)
Dept: OBGYN CLINIC | Facility: HOSPITAL | Age: 69
End: 2019-10-30
Payer: COMMERCIAL

## 2019-10-30 VITALS
HEART RATE: 78 BPM | WEIGHT: 231 LBS | HEIGHT: 71 IN | DIASTOLIC BLOOD PRESSURE: 76 MMHG | SYSTOLIC BLOOD PRESSURE: 142 MMHG | BODY MASS INDEX: 32.34 KG/M2

## 2019-10-30 DIAGNOSIS — M72.0 DUPUYTREN'S CONTRACTURE: Primary | ICD-10-CM

## 2019-10-30 PROCEDURE — 99213 OFFICE O/P EST LOW 20 MIN: CPT | Performed by: ORTHOPAEDIC SURGERY

## 2019-10-30 NOTE — PROGRESS NOTES
ASSESSMENT/PLAN:    Assessment:   Dupuytren's disease of bilateral hands  Status post Xiaflex injection and manipulation on right long and small finger    Plan: It was discussed with the patient that the rest that he developed after his manipulation of his long and small finger was probably not due to the Xiaflex medication with a lidocaine due to the patient not having any previous reactions while at the dentist with lidocaine and the fact that the patient applied the cream to his rash and the rash resolved the next day  It was discussed with the patient that risks of benefits of proceeding with further Xiaflex for just proceeding with surgical intervention at this time  The patient would like to except the risks of proceeding with repeat Xiaflex for his right hand small finger PIP joint at this time  Surgical intervention of her percutaneous release with a 30% recurrence rate over 10 years was discussed versus Dupuytren's excision with a 10% recurrence rate over 10 years  It was discussed with the patient once we order his refills that we will schedule his Xiaflex injection on a Friday and a manipulation on of Wednesday  Follow Up: We will call the patient when the Xiaflex arise in the office    To Do Next Visit:       General Discussions:     Dupuytren's Disease: The anatomy and physiology of Dupuytren's disease were discussed with the patient today in the office  Increased scar formation within the interval between the skin volarly and the flexor tendons dorsally can result in pit formation, nodular formation, or eventual cord formation  These pathologic cords can cause contracture at either the metacarpophalangeal joint, proximal interphalangeal joint, or both  As the cords progress towards the proximal interphalangeal joint, the neurovascular structures of the finger may become involved within the disease process    While this is a genetic condition, variable penetrance occurs within family trees  Conservative treatment options including therapy to maintain joint mobility and a tabletop test were discussed  Other treatments include Xiaflex and possible surgical intervention  Operative Discussions:       _____________________________________________________  CHIEF COMPLAINT:  Chief Complaint   Patient presents with    Right Hand - Follow-up         SUBJECTIVE:  Remi Salgado is a 81 y o  male who presents for follow up regarding Dupuytrens Disease of the  bilateral  hand  Since last visit, Remi Salgado has tried Xiaflex injection and manipulation to right long and small finger with relief  Today there is Stiffness/LROM to the right small finger PIP joint  The PIP joint was not addressed at previous Xiaflex injections  Patient states that after his Xiaflex manipulation he does notice that he developed a rash going from his right hand of his right forearm  Patient states that he did apply cream and the next day it did go away  Patient states that he feels like to looked like a heat rash  Patient states that this did resolve in about 1-2 days    Radiation: Yes to the  small finger  Associated symptoms: No Complaints    PAST MEDICAL HISTORY:  Past Medical History:   Diagnosis Date    Benign colon polyp     last assessed: 6/19/2013    Benign essential hypertension 11/08/2011    Benazepril, HCTZ    Chronic rhinitis 11/08/2011    zyrtec, nasal saline    Depression 01/18/2010    continue Wellbutrin SL, doing well    Disorder of male genital organs 01/18/2010    refer to uro Dr Demetris Mitchell Multiple benign polyps of large intestine     Neoplasm of uncertain behavior of skin 09/11/2009    refer to derm    Testis mass 09/11/2009    (___cm)  U/S scrotum       PAST SURGICAL HISTORY:  Past Surgical History:   Procedure Laterality Date    HAND SURGERY      TONSILLECTOMY      WISDOM TOOTH EXTRACTION         FAMILY HISTORY:  Family History   Problem Relation Age of Onset    Cancer Mother    [facility] Depression Mother     Bone cancer Mother     Diabetes Mother         mellitus    No Known Problems Father     Diabetes Family         mellitus    Substance Abuse Neg Hx        SOCIAL HISTORY:  Social History     Tobacco Use    Smoking status: Former Smoker     Last attempt to quit: 2014     Years since quittin 7    Smokeless tobacco: Never Used   Substance Use Topics    Alcohol use: Yes     Comment: 1-2 beers a night     Drug use: No       MEDICATIONS:    Current Outpatient Medications:     albuterol (VENTOLIN HFA) 90 mcg/act inhaler, Inhale, Disp: , Rfl:     ALPRAZolam (XANAX) 0 25 mg tablet, TAKE 1 TABLET TWICE A DAY  AS NEEDED FOR ANXIETY, Disp: 120 tablet, Rfl: 0    aspirin 81 MG tablet, Take 1 tablet by mouth daily, Disp: , Rfl:     atorvastatin (LIPITOR) 10 mg tablet, Take 1 tablet (10 mg total) by mouth daily after dinner, Disp: 30 tablet, Rfl: 6    benazepril (LOTENSIN) 20 mg tablet, Take 1 tablet (20 mg total) by mouth daily, Disp: 90 tablet, Rfl: 3    buPROPion (WELLBUTRIN XL) 300 mg 24 hr tablet, TAKE 1 TABLET DAILY, Disp: 90 tablet, Rfl: 0    Cholecalciferol (VITAMIN D) 2000 units CAPS, Take by mouth, Disp: , Rfl:     fluticasone (FLONASE) 50 mcg/act nasal spray, into each nostril, Disp: , Rfl:     fluticasone-salmeterol (ADVAIR DISKUS) 250-50 mcg/dose inhaler, Inhale 1 puff 2 (two) times a day, Disp: 3 Inhaler, Rfl: 1    hydrochlorothiazide (HYDRODIURIL) 12 5 mg tablet, Take 1 tablet (12 5 mg total) by mouth daily, Disp: 90 tablet, Rfl: 3    loratadine (CLARITIN) 10 mg tablet, Take 10 mg by mouth daily, Disp: , Rfl:     Multiple Vitamins-Minerals (CENTRUM SILVER) CHEW, Chew, Disp: , Rfl:     Omega-3 Fatty Acids (FISH OIL) 645 MG CAPS, Take by mouth, Disp: , Rfl:     omeprazole (PriLOSEC) 20 mg delayed release capsule, Take 1 capsule by mouth, Disp: , Rfl:     EPINEPHrine (EPIPEN) 0 3 mg/0 3 mL SOAJ, Inject 0 3 mL (0 3 mg total) into a muscle once for 1 dose, Disp: 2 each, Rfl: 3    ALLERGIES:  Allergies   Allergen Reactions    Bee Venom Anaphylaxis    Latex     Other Allergic Rhinitis     Seasonal        REVIEW OF SYSTEMS:  Pertinent items are noted in HPI  LABS:  HgA1c: No results found for: HGBA1C  BMP:   Lab Results   Component Value Date    CALCIUM 9 4 10/15/2019    K 4 3 10/15/2019    CO2 31 10/15/2019     10/15/2019    BUN 18 10/15/2019    CREATININE 1 22 10/15/2019           _____________________________________________________  PHYSICAL EXAMINATION:  Vital signs: /76   Pulse 78   Ht 5' 11" (1 803 m)   Wt 105 kg (231 lb)   BMI 32 22 kg/m²   General: well developed and well nourished, alert, oriented times 3 and appears comfortable  Psychiatric: Normal  HEENT: Trachea Midline, No torticollis  Cardiovascular: No discernable arrhythmia  Pulmonary: No wheezing or stridor  Skin: No Erythema  Neurovascular: Sensation Intact to the Median, Ulnar, Radial Nerve, Motor Intact to the Median, Ulnar, Radial Nerve and Pulses Intact    MUSCULOSKELETAL EXAMINATION:  RIGHT SIDE:  Finger:  Small finger PIP joint contracture of 25°    Long finger MCP joint still achieve hyperextension and small finger can extend to neutral  palpable cord to paradigital sheath to ulnar side of small finger     _____________________________________________________  STUDIES REVIEWED:  No Studies to review      PROCEDURES PERFORMED:  Procedures  No Procedures performed today   Scribe Attestation    I,:   Emily Hart am acting as a scribe while in the presence of the attending physician :        I,:   Eulalia Porter MD personally performed the services described in this documentation    as scribed in my presence :

## 2019-11-12 ENCOUNTER — TELEPHONE (OUTPATIENT)
Dept: OBGYN CLINIC | Facility: HOSPITAL | Age: 69
End: 2019-11-12

## 2019-11-12 NOTE — TELEPHONE ENCOUNTER
Called patient to schedule xiaflex injection and release, no answer, left message for patient to call back

## 2019-11-12 NOTE — TELEPHONE ENCOUNTER
----- Message from Smita George sent at 11/4/2019  5:02 PM EST -----  Regarding: xiaflex delivery  Spoke to Ravi with [employer] xiaflex 2 vials t scheduled to be delivered on 11/7/19

## 2019-11-22 DIAGNOSIS — F41.8 ANXIETY WITH DEPRESSION: ICD-10-CM

## 2019-11-22 RX ORDER — BUPROPION HYDROCHLORIDE 300 MG/1
TABLET ORAL
Qty: 90 TABLET | Refills: 2 | Status: SHIPPED | OUTPATIENT
Start: 2019-11-22 | End: 2020-08-31

## 2019-12-06 ENCOUNTER — OFFICE VISIT (OUTPATIENT)
Dept: OBGYN CLINIC | Facility: HOSPITAL | Age: 69
End: 2019-12-06
Payer: COMMERCIAL

## 2019-12-06 VITALS
SYSTOLIC BLOOD PRESSURE: 119 MMHG | HEART RATE: 69 BPM | DIASTOLIC BLOOD PRESSURE: 79 MMHG | WEIGHT: 228 LBS | HEIGHT: 71 IN | BODY MASS INDEX: 31.92 KG/M2

## 2019-12-06 DIAGNOSIS — M72.0 DUPUYTREN'S CONTRACTURE: Primary | ICD-10-CM

## 2019-12-06 PROCEDURE — 99213 OFFICE O/P EST LOW 20 MIN: CPT | Performed by: ORTHOPAEDIC SURGERY

## 2019-12-06 PROCEDURE — 20527 NJX NZM PALMAR FASCIAL CORD: CPT | Performed by: ORTHOPAEDIC SURGERY

## 2019-12-06 NOTE — PROGRESS NOTES
ASSESSMENT/PLAN:    Assessment:   Dupuytren's disease of bilateral hands  Right small finger pip jt contracture    Plan:   The patient will be given a Xiaflex injection into right small finger today  The patient will be wrapped in a bulky dressing that he/she must keep on and dry for the next 2 days  She/he was advised to avoid any heavy activities over the next 2 days however light activities such as eating, drinking and dressing are okay  The patient was advised that he/she might experience some swelling, bruising or itching into the elbow or armpit and that all these symptoms are normal  At his/her follow up he/she was instructed that they will be given a numbing injection and a manipulation will be performed  He/she will then be fit with a splint that he will wear at night time for the next 3 months  Follow Up:  12/11/19    To Do Next Visit:   xiaflex manipulation    General Discussions:     Xiaflex: The process of receiving Xiaflex injection was discussed with the patient today  This includes 3-4 injections in the area of the affected cord  The hand will then be bandaged for 2-3 consecutive days, at which time the patient will return to the office for manipulation of the affected finger  Risks, benefits and alternatives of Xiaflex injections were discussed with the patient today  Risks include bleeding, infection, pain, swelling, bruising, itching, breaks in the skin, redness/warmth of the skin, allergic reaction, tendon rupture, ligament damage, and nerve/vessel injury  Patient agrees to proceed with the injection  Authorization process will be initiated with patient to follow up once this is achieved        Operative Discussions:       _____________________________________________________  CHIEF COMPLAINT:  Chief Complaint   Patient presents with    Right Hand - Injections         SUBJECTIVE:  Shabnam Johnson is a 81 y o  male who presents for follow up regarding Dupuytrens Disease of the  right  small finger  Since last visit, Jaelyn Dao has tried xiaflex release with only partial relief  Today there is Stiffness/LROM to the right small finger      Radiation: Yes to the  small finger  Associated symptoms: No Complaints    PAST MEDICAL HISTORY:  Past Medical History:   Diagnosis Date    Benign colon polyp     last assessed: 2013    Benign essential hypertension 2011    Benazepril, HCTZ    Chronic rhinitis 2011    zyrtec, nasal saline    Depression 2010    continue Wellbutrin SL, doing well    Disorder of male genital organs 2010    refer to uro Dr Jenniffer Murphy Multiple benign polyps of large intestine     Neoplasm of uncertain behavior of skin 2009    refer to derm    Testis mass 2009    (___cm)  U/S scrotum       PAST SURGICAL HISTORY:  Past Surgical History:   Procedure Laterality Date    HAND SURGERY      TONSILLECTOMY      WISDOM TOOTH EXTRACTION         FAMILY HISTORY:  Family History   Problem Relation Age of Onset   [facility] Cancer Mother     Depression Mother     Bone cancer Mother     Diabetes Mother         mellitus    No Known Problems Father     Diabetes Family         mellitus    Substance Abuse Neg Hx        SOCIAL HISTORY:  Social History     Tobacco Use    Smoking status: Former Smoker     Last attempt to quit: 2014     Years since quittin 8    Smokeless tobacco: Never Used   Substance Use Topics    Alcohol use: Yes     Comment: 1-2 beers a night     Drug use: No       MEDICATIONS:    Current Outpatient Medications:     albuterol (VENTOLIN HFA) 90 mcg/act inhaler, Inhale, Disp: , Rfl:     ALPRAZolam (XANAX) 0 25 mg tablet, TAKE 1 TABLET TWICE A DAY  AS NEEDED FOR ANXIETY, Disp: 120 tablet, Rfl: 0    aspirin 81 MG tablet, Take 1 tablet by mouth daily, Disp: , Rfl:     atorvastatin (LIPITOR) 10 mg tablet, Take 1 tablet (10 mg total) by mouth daily after dinner, Disp: 30 tablet, Rfl: 6    benazepril (LOTENSIN) 20 mg tablet, Take 1 tablet (20 mg total) by mouth daily, Disp: 90 tablet, Rfl: 3    buPROPion (WELLBUTRIN XL) 300 mg 24 hr tablet, TAKE 1 TABLET DAILY, Disp: 90 tablet, Rfl: 2    Cholecalciferol (VITAMIN D) 2000 units CAPS, Take by mouth, Disp: , Rfl:     fluticasone (FLONASE) 50 mcg/act nasal spray, into each nostril, Disp: , Rfl:     fluticasone-salmeterol (ADVAIR DISKUS) 250-50 mcg/dose inhaler, Inhale 1 puff 2 (two) times a day, Disp: 3 Inhaler, Rfl: 1    hydrochlorothiazide (HYDRODIURIL) 12 5 mg tablet, Take 1 tablet (12 5 mg total) by mouth daily, Disp: 90 tablet, Rfl: 3    loratadine (CLARITIN) 10 mg tablet, Take 10 mg by mouth daily, Disp: , Rfl:     Multiple Vitamins-Minerals (CENTRUM SILVER) CHEW, Chew, Disp: , Rfl:     Omega-3 Fatty Acids (FISH OIL) 645 MG CAPS, Take by mouth, Disp: , Rfl:     omeprazole (PriLOSEC) 20 mg delayed release capsule, Take 1 capsule by mouth, Disp: , Rfl:     EPINEPHrine (EPIPEN) 0 3 mg/0 3 mL SOAJ, Inject 0 3 mL (0 3 mg total) into a muscle once for 1 dose, Disp: 2 each, Rfl: 3    ALLERGIES:  Allergies   Allergen Reactions    Bee Venom Anaphylaxis    Latex     Other Allergic Rhinitis     Seasonal        REVIEW OF SYSTEMS:  Pertinent items are noted in HPI      LABS:  HgA1c: No results found for: HGBA1C  BMP:   Lab Results   Component Value Date    CALCIUM 9 4 10/15/2019    K 4 3 10/15/2019    CO2 31 10/15/2019     10/15/2019    BUN 18 10/15/2019    CREATININE 1 22 10/15/2019           _____________________________________________________  PHYSICAL EXAMINATION:  Vital signs: /79   Pulse 69   Ht 5' 11" (1 803 m)   Wt 103 kg (228 lb)   BMI 31 80 kg/m²   General: well developed and well nourished, alert, oriented times 3 and appears comfortable  Psychiatric: Normal  HEENT: Trachea Midline, No torticollis  Cardiovascular: No discernable arrhythmia  Pulmonary: No wheezing or stridor  Skin: No Erythema  Neurovascular: Sensation Intact to the Median, Ulnar, Radial Nerve, Motor Intact to the Median, Ulnar, Radial Nerve and Pulses Intact    MUSCULOSKELETAL EXAMINATION:  RIGHT SIDE:  Small finger: para digital sheath cord localized to ulnar side with pip jt contracture of 25 degrees       _____________________________________________________  STUDIES REVIEWED:  No Studies to review      PROCEDURES PERFORMED:  Hand/upper extremity injection: R small finger  Date/Time: 12/6/2019 12:52 PM  Consent given by: patient  Site marked: site marked  Supporting Documentation  Indications: therapeutic   Procedure Details  Condition:Dupuytren's contracture Dupuytren's Contracture Procedure: Dupuytren's contracture injectionSite: R small finger   Specialty Pharmacy Supplied: received medications from pharmacyMedications administered: 0 58 mg collagenase clostridium histolyticum 0 9 MG    Patient tolerance: patient tolerated the procedure well with no immediate complications  Dressing:  Sterile dressing applied             Scribe Attestation    I,:   Mandie Thomas am acting as a scribe while in the presence of the attending physician :        I,:   Iza Moore MD personally performed the services described in this documentation    as scribed in my presence :

## 2019-12-11 VITALS
HEIGHT: 71 IN | BODY MASS INDEX: 31.92 KG/M2 | DIASTOLIC BLOOD PRESSURE: 84 MMHG | HEART RATE: 80 BPM | WEIGHT: 228 LBS | SYSTOLIC BLOOD PRESSURE: 145 MMHG

## 2019-12-11 DIAGNOSIS — M72.0 DUPUYTREN'S CONTRACTURE: Primary | ICD-10-CM

## 2019-12-11 PROCEDURE — 26341 MANIPULAT PALM CORD POST INJ: CPT | Performed by: ORTHOPAEDIC SURGERY

## 2019-12-11 PROCEDURE — 99213 OFFICE O/P EST LOW 20 MIN: CPT | Performed by: ORTHOPAEDIC SURGERY

## 2019-12-11 RX ORDER — LIDOCAINE HYDROCHLORIDE 10 MG/ML
2.5 INJECTION, SOLUTION EPIDURAL; INFILTRATION; INTRACAUDAL; PERINEURAL
Status: COMPLETED | OUTPATIENT
Start: 2019-12-11 | End: 2019-12-11

## 2019-12-11 RX ORDER — LIDOCAINE HYDROCHLORIDE 10 MG/ML
5 INJECTION, SOLUTION EPIDURAL; INFILTRATION; INTRACAUDAL; PERINEURAL
Status: COMPLETED | OUTPATIENT
Start: 2019-12-11 | End: 2019-12-11

## 2019-12-11 RX ADMIN — LIDOCAINE HYDROCHLORIDE 5 ML: 10 INJECTION, SOLUTION EPIDURAL; INFILTRATION; INTRACAUDAL; PERINEURAL at 15:04

## 2019-12-11 RX ADMIN — LIDOCAINE HYDROCHLORIDE 2.5 ML: 10 INJECTION, SOLUTION EPIDURAL; INFILTRATION; INTRACAUDAL; PERINEURAL at 15:04

## 2019-12-11 NOTE — PROGRESS NOTES
ASSESSMENT/PLAN:    Assessment:   Dupuytren's disease of bilateral hands  Right small finger pip jt contracture    Plan:   The patient was given a Xiaflex manipulation today  He/she was instructed that they do not have any formal restrictions at this time and may resume activities as tolerated  The patient will meet with the OT in the office today and be fit in a custom extension splint to wear at night time for the next 1-2 months  He will also be given HEP for stretching  It was discussed with the patient that we will order Brianafurt for his left hand  We will proceed with an injection into his long and ring finger first with 2 vials followed by the small finger 1 month after  We will schedule him for his injection on a Friday and manipulation on a Wednesday  Follow Up: We will call the patient with his Brianafurt arrives in the office    To Do Next Visit:       General Discussions:     Xiaflex: The process of receiving Xiaflex injection was discussed with the patient today  This includes 3-4 injections in the area of the affected cord  The hand will then be bandaged for 2-3 consecutive days, at which time the patient will return to the office for manipulation of the affected finger  Risks, benefits and alternatives of Xiaflex injections were discussed with the patient today  Risks include bleeding, infection, pain, swelling, bruising, itching, breaks in the skin, redness/warmth of the skin, allergic reaction, tendon rupture, ligament damage, and nerve/vessel injury  Patient agrees to proceed with the injection  Authorization process will be initiated with patient to follow up once this is achieved        Operative Discussions:       _____________________________________________________  CHIEF COMPLAINT:  Chief Complaint   Patient presents with    Right Hand - Follow-up         SUBJECTIVE:  Maisha Geller is a 81 y o  male who presents for follow up regarding Dupuytren's disease of bilateral hands and Right small finger pip jt contracture  Since last visit, Belen Manzo has tried Xiaflex without relief  Today there is Stiffness/LROM to the right small finger      Radiation: None  Associated symptoms: No Complaints    PAST MEDICAL HISTORY:  Past Medical History:   Diagnosis Date    Benign colon polyp     last assessed: 2013    Benign essential hypertension 2011    Benazepril, HCTZ    Chronic rhinitis 2011    zyrtec, nasal saline    Depression 2010    continue Wellbutrin SL, doing well    Disorder of male genital organs 2010    refer to uro Dr Melany Whitley Multiple benign polyps of large intestine     Neoplasm of uncertain behavior of skin 2009    refer to derm    Testis mass 2009    (___cm)  U/S scrotum       PAST SURGICAL HISTORY:  Past Surgical History:   Procedure Laterality Date    HAND SURGERY      TONSILLECTOMY      WISDOM TOOTH EXTRACTION         FAMILY HISTORY:  Family History   Problem Relation Age of Onset   Martha Dunaway Cancer Mother     Depression Mother     Bone cancer Mother     Diabetes Mother         mellitus    No Known Problems Father     Diabetes Family         mellitus    Substance Abuse Neg Hx        SOCIAL HISTORY:  Social History     Tobacco Use    Smoking status: Former Smoker     Last attempt to quit: 2014     Years since quittin 8    Smokeless tobacco: Never Used   Substance Use Topics    Alcohol use: Yes     Comment: 1-2 beers a night     Drug use: No       MEDICATIONS:    Current Outpatient Medications:     albuterol (VENTOLIN HFA) 90 mcg/act inhaler, Inhale, Disp: , Rfl:     ALPRAZolam (XANAX) 0 25 mg tablet, TAKE 1 TABLET TWICE A DAY  AS NEEDED FOR ANXIETY, Disp: 120 tablet, Rfl: 0    aspirin 81 MG tablet, Take 1 tablet by mouth daily, Disp: , Rfl:     atorvastatin (LIPITOR) 10 mg tablet, Take 1 tablet (10 mg total) by mouth daily after dinner, Disp: 30 tablet, Rfl: 6    benazepril (LOTENSIN) 20 mg tablet, Take 1 tablet (20 mg total) by mouth daily, Disp: 90 tablet, Rfl: 3    buPROPion (WELLBUTRIN XL) 300 mg 24 hr tablet, TAKE 1 TABLET DAILY, Disp: 90 tablet, Rfl: 2    Cholecalciferol (VITAMIN D) 2000 units CAPS, Take by mouth, Disp: , Rfl:     fluticasone (FLONASE) 50 mcg/act nasal spray, into each nostril, Disp: , Rfl:     fluticasone-salmeterol (ADVAIR DISKUS) 250-50 mcg/dose inhaler, Inhale 1 puff 2 (two) times a day, Disp: 3 Inhaler, Rfl: 1    hydrochlorothiazide (HYDRODIURIL) 12 5 mg tablet, Take 1 tablet (12 5 mg total) by mouth daily, Disp: 90 tablet, Rfl: 3    loratadine (CLARITIN) 10 mg tablet, Take 10 mg by mouth daily, Disp: , Rfl:     Multiple Vitamins-Minerals (CENTRUM SILVER) CHEW, Chew, Disp: , Rfl:     Omega-3 Fatty Acids (FISH OIL) 645 MG CAPS, Take by mouth, Disp: , Rfl:     omeprazole (PriLOSEC) 20 mg delayed release capsule, Take 1 capsule by mouth, Disp: , Rfl:     EPINEPHrine (EPIPEN) 0 3 mg/0 3 mL SOAJ, Inject 0 3 mL (0 3 mg total) into a muscle once for 1 dose, Disp: 2 each, Rfl: 3    ALLERGIES:  Allergies   Allergen Reactions    Bee Venom Anaphylaxis    Latex     Other Allergic Rhinitis     Seasonal        REVIEW OF SYSTEMS:  Pertinent items are noted in HPI      LABS:  HgA1c: No results found for: HGBA1C  BMP:   Lab Results   Component Value Date    CALCIUM 9 4 10/15/2019    K 4 3 10/15/2019    CO2 31 10/15/2019     10/15/2019    BUN 18 10/15/2019    CREATININE 1 22 10/15/2019           _____________________________________________________  PHYSICAL EXAMINATION:  Vital signs: /84   Pulse 80   Ht 5' 11" (1 803 m)   Wt 103 kg (228 lb)   BMI 31 80 kg/m²   General: well developed and well nourished, alert, oriented times 3 and appears comfortable  Psychiatric: Normal  HEENT: Trachea Midline, No torticollis  Cardiovascular: No discernable arrhythmia  Pulmonary: No wheezing or stridor  Skin: No Erythema, No Lacerations  Neurovascular: Sensation Intact to the Median, Ulnar, Radial Nerve, Motor Intact to the Median, Ulnar, Radial Nerve and Pulses Intact    MUSCULOSKELETAL EXAMINATION:  RIGHT SIDE:  Finger:  small finger: prior to manipulation 20 degree pip jt contracture, after manipulation pip jt can achieve hyperextension   LEFT SIDE:  Central cord extending to a spiral to the ulnar aspect of long finger with a 30 degree MP joint contracture, central cord extending the ring finger radial side with MP joint contracture of 20 degrees, central cord to the radial aspect of small finger with an MP joint contracture of 10 degrees    There is also an ulnar repair digital cord with the PIP joint contraction to small finger PIP of 90 degrees and DIP of 30 degrees    _____________________________________________________  STUDIES REVIEWED:  No Studies to review      PROCEDURES PERFORMED:  Hand/upper extremity injection: R small finger  Date/Time: 12/11/2019 3:04 PM  Consent given by: patient  Site marked: site marked  Supporting Documentation  Indications: therapeutic   Procedure Details  Condition:Dupuytren's contracture Dupuytren's Contracture Procedure: Dupuytren's contracture manipulationSite: R small finger   Medications administered: 5 mL lidocaine (PF) 1 %; 2 5 mL lidocaine (PF) 1 %    Patient tolerance: patient tolerated the procedure well with no immediate complications  Dressing:  Sterile dressing applied             Scribe Attestation    I,:   Montse Morgan am acting as a scribe while in the presence of the attending physician :        I,:   Felipa Guillory MD personally performed the services described in this documentation    as scribed in my presence :

## 2019-12-11 NOTE — PATIENT INSTRUCTIONS
The patient was given a Xiaflex manipulation today  He/she was instructed that they do not have any formal restrictions at this time and may resume activities as tolerated  The patient will meet with the OT in the office today and be fit in a custom extension splint to wear at night time for the next 1-2 months

## 2020-01-16 ENCOUNTER — TELEPHONE (OUTPATIENT)
Dept: FAMILY MEDICINE CLINIC | Facility: CLINIC | Age: 70
End: 2020-01-16

## 2020-01-16 NOTE — TELEPHONE ENCOUNTER
Pt called to request new scripts be sent to his mail order service  Please send a script for albuterol (Ventolin) and Advair inhalers to [facility] mail order in his chart

## 2020-01-17 DIAGNOSIS — J44.9 CHRONIC OBSTRUCTIVE PULMONARY DISEASE, UNSPECIFIED COPD TYPE (HCC): ICD-10-CM

## 2020-01-17 RX ORDER — ALBUTEROL SULFATE 90 UG/1
2 AEROSOL, METERED RESPIRATORY (INHALATION) EVERY 6 HOURS PRN
Qty: 3 INHALER | Refills: 0 | Status: SHIPPED | OUTPATIENT
Start: 2020-01-17

## 2020-01-31 DIAGNOSIS — F41.8 ANXIETY WITH DEPRESSION: ICD-10-CM

## 2020-01-31 RX ORDER — ALPRAZOLAM 0.25 MG/1
TABLET ORAL
Qty: 120 TABLET | Refills: 0 | Status: SHIPPED | OUTPATIENT
Start: 2020-01-31 | End: 2020-02-10 | Stop reason: SDUPTHER

## 2020-02-07 ENCOUNTER — TELEPHONE (OUTPATIENT)
Dept: FAMILY MEDICINE CLINIC | Facility: CLINIC | Age: 70
End: 2020-02-07

## 2020-02-07 NOTE — TELEPHONE ENCOUNTER
Pt called because the [facility] said they never received the request for Xanax you sent on 1/31    I check and it was confirmed received, but they insist they never got it  They told patient that since he is almost out and needs it ASAP, you can call it in  To [phone] and request a 90 day supply to his local [facility] so he can pick it up there

## 2020-02-10 DIAGNOSIS — F41.8 ANXIETY WITH DEPRESSION: ICD-10-CM

## 2020-02-10 RX ORDER — ALPRAZOLAM 0.25 MG/1
0.25 TABLET ORAL 2 TIMES DAILY PRN
Qty: 10 TABLET | Refills: 0 | Status: SHIPPED | OUTPATIENT
Start: 2020-02-10 | End: 2020-05-13 | Stop reason: SDUPTHER

## 2020-02-10 NOTE — TELEPHONE ENCOUNTER
Spoke to Ileana the pharmacist from [address] [place] [address] and the Rx was called in  It will take 5-7 days for the pt to receive it

## 2020-04-26 DIAGNOSIS — E78.2 HYPERLIPEMIA, MIXED: ICD-10-CM

## 2020-04-26 RX ORDER — ATORVASTATIN CALCIUM 10 MG/1
10 TABLET, FILM COATED ORAL
Qty: 90 TABLET | Refills: 2 | Status: SHIPPED | OUTPATIENT
Start: 2020-04-26

## 2020-05-13 ENCOUNTER — TELEPHONE (OUTPATIENT)
Dept: FAMILY MEDICINE CLINIC | Facility: CLINIC | Age: 70
End: 2020-05-13

## 2020-05-13 DIAGNOSIS — F41.8 ANXIETY WITH DEPRESSION: ICD-10-CM

## 2020-05-13 DIAGNOSIS — I10 ESSENTIAL HYPERTENSION: ICD-10-CM

## 2020-05-13 RX ORDER — HYDROCHLOROTHIAZIDE 12.5 MG/1
12.5 TABLET ORAL DAILY
Qty: 90 TABLET | Refills: 3 | Status: SHIPPED | OUTPATIENT
Start: 2020-05-13 | End: 2021-05-11

## 2020-05-13 RX ORDER — BENAZEPRIL HYDROCHLORIDE 20 MG/1
20 TABLET ORAL DAILY
Qty: 90 TABLET | Refills: 3 | Status: SHIPPED | OUTPATIENT
Start: 2020-05-13

## 2020-05-13 RX ORDER — ALPRAZOLAM 0.25 MG/1
0.25 TABLET ORAL 2 TIMES DAILY PRN
Qty: 120 TABLET | Refills: 1 | Status: SHIPPED | OUTPATIENT
Start: 2020-05-13 | End: 2021-02-10

## 2020-07-21 NOTE — TELEPHONE ENCOUNTER
LM TO COVID SCREEN AND ALSO TO ASK PT ABOUT ANOTHER APPT ON SCHEDULE FOR [facility] 7/24? IT LOOKS LIKE APPT IS FOR SAME THING SO WONDERING IF THE Friday APPT IS CORRECT?

## 2020-08-31 DIAGNOSIS — F41.8 ANXIETY WITH DEPRESSION: ICD-10-CM

## 2020-08-31 RX ORDER — BUPROPION HYDROCHLORIDE 300 MG/1
TABLET ORAL
Qty: 90 TABLET | Refills: 0 | Status: SHIPPED | OUTPATIENT
Start: 2020-08-31 | End: 2020-11-20 | Stop reason: SDUPTHER

## 2020-11-20 ENCOUNTER — TELEPHONE (OUTPATIENT)
Dept: FAMILY MEDICINE CLINIC | Facility: CLINIC | Age: 70
End: 2020-11-20

## 2020-11-20 DIAGNOSIS — F41.8 ANXIETY WITH DEPRESSION: ICD-10-CM

## 2020-11-20 RX ORDER — BUPROPION HYDROCHLORIDE 300 MG/1
300 TABLET ORAL DAILY
Qty: 90 TABLET | Refills: 0 | Status: SHIPPED | OUTPATIENT
Start: 2020-11-20 | End: 2021-02-08

## 2021-01-27 DIAGNOSIS — Z23 ENCOUNTER FOR IMMUNIZATION: ICD-10-CM

## 2021-02-08 DIAGNOSIS — F41.8 ANXIETY WITH DEPRESSION: ICD-10-CM

## 2021-02-08 RX ORDER — BUPROPION HYDROCHLORIDE 300 MG/1
TABLET ORAL
Qty: 90 TABLET | Refills: 0 | Status: SHIPPED | OUTPATIENT
Start: 2021-02-08

## 2021-02-10 DIAGNOSIS — F41.8 ANXIETY WITH DEPRESSION: ICD-10-CM

## 2021-02-10 RX ORDER — ALPRAZOLAM 0.25 MG/1
TABLET ORAL
Qty: 120 TABLET | Refills: 1 | Status: SHIPPED | OUTPATIENT
Start: 2021-02-10

## 2021-02-23 ENCOUNTER — IMMUNIZATIONS (OUTPATIENT)
Dept: FAMILY MEDICINE CLINIC | Facility: HOSPITAL | Age: 71
End: 2021-02-23

## 2021-02-23 DIAGNOSIS — Z23 ENCOUNTER FOR IMMUNIZATION: Primary | ICD-10-CM

## 2021-02-23 PROCEDURE — 0001A SARS-COV-2 / COVID-19 MRNA VACCINE (PFIZER-BIONTECH) 30 MCG: CPT

## 2021-02-23 PROCEDURE — 91300 SARS-COV-2 / COVID-19 MRNA VACCINE (PFIZER-BIONTECH) 30 MCG: CPT

## 2021-03-16 ENCOUNTER — IMMUNIZATIONS (OUTPATIENT)
Dept: FAMILY MEDICINE CLINIC | Facility: HOSPITAL | Age: 71
End: 2021-03-16

## 2021-03-16 DIAGNOSIS — Z23 ENCOUNTER FOR IMMUNIZATION: Primary | ICD-10-CM

## 2021-03-16 PROCEDURE — 91300 SARS-COV-2 / COVID-19 MRNA VACCINE (PFIZER-BIONTECH) 30 MCG: CPT

## 2021-03-16 PROCEDURE — 0002A SARS-COV-2 / COVID-19 MRNA VACCINE (PFIZER-BIONTECH) 30 MCG: CPT

## 2021-05-02 DIAGNOSIS — F41.8 ANXIETY WITH DEPRESSION: ICD-10-CM

## 2021-05-02 RX ORDER — BUPROPION HYDROCHLORIDE 300 MG/1
TABLET ORAL
Qty: 90 TABLET | Refills: 0 | OUTPATIENT
Start: 2021-05-02

## 2021-05-11 DIAGNOSIS — I10 ESSENTIAL HYPERTENSION: ICD-10-CM

## 2021-05-11 RX ORDER — HYDROCHLOROTHIAZIDE 12.5 MG/1
TABLET ORAL
Qty: 90 TABLET | Refills: 0 | Status: SHIPPED | OUTPATIENT
Start: 2021-05-11

## 2021-05-25 ENCOUNTER — TELEPHONE (OUTPATIENT)
Dept: OBGYN CLINIC | Facility: HOSPITAL | Age: 71
End: 2021-05-25

## 2022-07-19 ENCOUNTER — TELEPHONE (OUTPATIENT)
Dept: OBGYN CLINIC | Facility: HOSPITAL | Age: 72
End: 2022-07-19

## 2022-07-19 NOTE — TELEPHONE ENCOUNTER
Dr Yaya Nagy  RE: Records       Patient called on update of medical record transfer from 05/25/2021    [per media, records faxed to [phone] [facility] 05/25/2021    Patient will call [facility] health for an update    Provided patient with Maia Fax# incase they need to send over a new record request for 2022